# Patient Record
Sex: MALE | Race: WHITE | Employment: FULL TIME | URBAN - METROPOLITAN AREA
[De-identification: names, ages, dates, MRNs, and addresses within clinical notes are randomized per-mention and may not be internally consistent; named-entity substitution may affect disease eponyms.]

---

## 2017-02-09 ENCOUNTER — OFFICE VISIT (OUTPATIENT)
Dept: NEUROLOGY | Age: 52
End: 2017-02-09

## 2017-02-09 VITALS
SYSTOLIC BLOOD PRESSURE: 152 MMHG | HEART RATE: 71 BPM | WEIGHT: 215 LBS | DIASTOLIC BLOOD PRESSURE: 91 MMHG | HEIGHT: 66 IN | BODY MASS INDEX: 34.55 KG/M2 | TEMPERATURE: 97.8 F

## 2017-02-09 DIAGNOSIS — I63.50 CEREBROVASCULAR ACCIDENT (CVA) DUE TO OCCLUSION OF CEREBRAL ARTERY (HCC): Primary | ICD-10-CM

## 2017-02-09 RX ORDER — ATORVASTATIN CALCIUM 80 MG/1
80 TABLET, FILM COATED ORAL DAILY
Qty: 30 TAB | Refills: 2 | Status: SHIPPED | OUTPATIENT
Start: 2017-02-09 | End: 2017-02-09 | Stop reason: DRUGHIGH

## 2017-02-09 RX ORDER — CLOPIDOGREL BISULFATE 75 MG/1
75 TABLET ORAL DAILY
Qty: 21 TAB | Refills: 0 | Status: SHIPPED | OUTPATIENT
Start: 2017-02-09 | End: 2017-02-28 | Stop reason: SDUPTHER

## 2017-02-09 RX ORDER — ASPIRIN 325 MG
325 TABLET ORAL DAILY
Qty: 30 TAB | Refills: 2 | Status: SHIPPED | OUTPATIENT
Start: 2017-02-09

## 2017-02-09 RX ORDER — ATORVASTATIN CALCIUM 20 MG/1
TABLET, FILM COATED ORAL DAILY
COMMUNITY
End: 2017-02-09 | Stop reason: DRUGHIGH

## 2017-02-09 RX ORDER — ATORVASTATIN CALCIUM 80 MG/1
80 TABLET, FILM COATED ORAL DAILY
Qty: 30 TAB | Refills: 0 | Status: SHIPPED | OUTPATIENT
Start: 2017-02-09 | End: 2017-03-17 | Stop reason: SDUPTHER

## 2017-02-09 NOTE — MR AVS SNAPSHOT
Visit Information Date & Time Provider Department Dept. Phone Encounter #  
 2/9/2017  2:00 PM Laurent Avilez, [address] [place] [address] [phone] [id number] Upcoming Health Maintenance Date Due DTaP/Tdap/Td series (1 - Tdap) 6/28/1987 FOBT Q 1 YEAR AGE 50-75 6/28/2016 INFLUENZA AGE 9 TO ADULT 8/1/2016 Allergies as of 2/9/2017  Review Complete On: 2/9/2017 By: Audrey Joshi No Known Allergies Current Immunizations  Never Reviewed No immunizations on file. Not reviewed this visit You Were Diagnosed With   
  
 Codes Comments Cerebrovascular accident (CVA) due to occlusion of cerebral artery ([facility]    -  Primary ICD-10-CM: I63.50 ICD-9-CM: 434.91 Vitals BP Pulse Temp Height(growth percentile) Weight(growth percentile) BMI  
 (!) 152/91 71 97.8 °F (36.6 °C) (Oral) 5' 6\" (1.676 m) 215 lb (97.5 kg) 34.7 kg/m2 Smoking Status Never Smoker Vitals History BMI and BSA Data Body Mass Index Body Surface Area 34.7 kg/m 2 2.13 m 2 Preferred Pharmacy Pharmacy Name Phone [facility], [facility] [address] [phone] Your Updated Medication List  
  
   
This list is accurate as of: 2/9/17  4:51 PM.  Always use your most recent med list.  
  
  
  
  
 aspirin 325 mg tablet Commonly known as:  ASPIRIN Take 1 Tab by mouth daily. Indications: acute thromboembolic stroke  
  
 atorvastatin 80 mg tablet Commonly known as:  LIPITOR Take 1 Tab by mouth daily. Indications: prevention of cerebrovascular accident  
  
 clopidogrel 75 mg Tab Commonly known as:  PLAVIX Take 1 Tab by mouth daily for 21 days. Indications: Cerebral Thromboembolism Prevention  
  
 gabapentin 100 mg capsule Commonly known as:  NEURONTIN Take 300 mg by mouth three (3) times daily. LANTUS 100 unit/mL injection Generic drug:  insulin glargine by SubCUTAneous route nightly. levETIRAcetam 1,000 mg tablet Commonly known as:  KEPPRA Take 1 Tab by mouth two (2) times a day. lisinopril 20 mg tablet Commonly known as:  Park Cambria Take 20 mg by mouth daily. metFORMIN 500 mg tablet Commonly known as:  GLUCOPHAGE Take 1.5 Tabs by mouth two (2) times daily (with meals). valsartan 160 mg tablet Commonly known as:  DIOVAN Take 160 mg by mouth daily. Prescriptions Sent to Pharmacy Refills  
 aspirin (ASPIRIN) 325 mg tablet 2 Sig: Take 1 Tab by mouth daily. Indications: acute thromboembolic stroke Class: Normal  
 Pharmacy: [id number] [address] [place] [address],[address], [address] Ph #: [phone] Route: Oral  
 clopidogrel (PLAVIX) 75 mg tab 0 Sig: Take 1 Tab by mouth daily for 21 days. Indications: Cerebral Thromboembolism Prevention Class: Normal  
 Pharmacy: [id number] [address] [place] [address],[address], [address] Ph #: [phone] Route: Oral  
 atorvastatin (LIPITOR) 80 mg tablet 0 Sig: Take 1 Tab by mouth daily. Indications: prevention of cerebrovascular accident Class: Normal  
 Pharmacy: [id number] [address] [place] [address],[address], [address] Ph #: [phone] Route: Oral  
  
To-Do List   
 02/10/2017 Imaging:  CTA NECK   
  
 02/20/2017 12:15 PM  
  Appointment with [facility] CT RM 2 at [facility] CT ([phone]) DIET RESTRICTIONS  Nothing to eat or drink 4 hours prior to study May have water to take meds  GENERAL INSTRUCTIONS  If you were given medications to take for a contrast allergy prior to having this study, please arrange to have someone drive you to your appointment. If you have had a creatinine level drawn within the past 30 days, please bring most recent results to your appt. This study does not require you to drink contrast prior to your study.   MEDICATIONS  Bring a complete list of all medications you are currently taking to include prescriptions, over-the-counter meds, herbals, vitamins & any dietary supplements. OUTSIDE FILMS  Bring outside films, CDs, and reports related to the study with you on the day of your exam.  QUESTIONS  Notify the CT Department if you have any questions concerning your study. Mayra Buchanan - [phone] [facility] Aba Tierney - [phone] Introducing [facility]! Dear Humphrey Parker: Thank you for requesting a [employer] account. Our records indicate that you already have an active [employer] account. You can access your account anytime at [email or web address] Did you know that you can access your hospital and ER discharge instructions at any time in [employer]? You can also review all of your test results from your hospital stay or ER visit. Additional Information If you have questions, please visit the Frequently Asked Questions section of the [employer] website at [email or web address] Remember, [employer] is NOT to be used for urgent needs. For medical emergencies, dial 911. Now available from your iPhone and Android! Please provide this summary of care documentation to your next provider. Your primary care clinician is listed as Lorena Sanchez. If you have any questions after today's visit, please call [phone].

## 2017-02-11 ENCOUNTER — TELEPHONE (OUTPATIENT)
Dept: NEUROLOGY | Age: 52
End: 2017-02-11

## 2017-02-11 NOTE — TELEPHONE ENCOUNTER
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 The patient has a history of epilepsy but did not have any seizures for may years. He had pancreatitis last year and his seizure medication was stopped. Two months ago he had a seizure and was evaluated by Dr. Vikash Velazquez and NP Adrian Paul. Levetiracetam was started. His studies at that time also showed a subacute RPICA infarct with intracranial RVA and RPICA occlusion. The patient was referred to the [address] [place] [address]. He was evaluated two days ago in the outpatient clinic. Further workup with CTA of the neck and implanted cardiac monitor (loop recorder) was requested. Clopidogrel was added to full dose aspirin for DAPT that is indicated for 3 months after an event from intracranial stenosis thereafter SAPT with full dose aspirin. Carina Skill was increased to full dose. The patient related he experienced an episode of decreased responsiveness about 1 hour ago. He said his wife told him that she had to help him but he does not remember it. She is not currently available. EMS was called but he declined to go to an emergency department. The patient says he is feeling better but not quite back to normal.  Hel does not sound well on the telephone       He was advised to go to the closest emergency department ([facility] or [facility]) preferably by EMS. It is not clear if his events are seizure or brainstem ischemia. Previously an MRI was not performed because of a long standing piece of metal in the skin over his left knee after trauma. An MRI should be performed if the metal item will not dislodge. If the metal item is problematic then removal should be considered so he can undergo MRI to help determine the etiology of these events that might cause permanent disability or even death.     If there is concern for cerebral ischemia the patient should be considered for transfer to the [address] [place] [address] through the [facility] ED. [facility] teleneurology should be contacted when he arrives. The teleneurologist will contact the neurovascular service if needed. The patient was evaluated two days ago in the neurovascular clinic for his intracranial stenosis and stroke.

## 2017-02-20 ENCOUNTER — HOSPITAL ENCOUNTER (OUTPATIENT)
Dept: CT IMAGING | Age: 52
Discharge: HOME | End: 2017-02-20
Attending: NURSE PRACTITIONER
Payer: COMMERCIAL

## 2017-02-20 DIAGNOSIS — I63.50 CEREBROVASCULAR ACCIDENT (CVA) DUE TO OCCLUSION OF CEREBRAL ARTERY (HCC): ICD-10-CM

## 2017-02-20 LAB — CREAT UR-MCNC: 0.6 MG/DL (ref 0.6–1.3)

## 2017-02-20 PROCEDURE — 74011636320 HC RX REV CODE- 636/320: Performed by: NURSE PRACTITIONER

## 2017-02-20 PROCEDURE — 74011000258 HC RX REV CODE- 258: Performed by: NURSE PRACTITIONER

## 2017-02-20 PROCEDURE — 82565 ASSAY OF CREATININE: CPT

## 2017-02-20 PROCEDURE — 70498 CT ANGIOGRAPHY NECK: CPT

## 2017-02-20 RX ORDER — SODIUM CHLORIDE 9 MG/ML
100 INJECTION, SOLUTION INTRAVENOUS ONCE
Status: COMPLETED | OUTPATIENT
Start: 2017-02-20 | End: 2017-02-20

## 2017-02-20 RX ADMIN — SODIUM CHLORIDE 100 ML: 900 INJECTION, SOLUTION INTRAVENOUS at 13:17

## 2017-02-20 RX ADMIN — IOPAMIDOL 45 ML: 612 INJECTION, SOLUTION INTRAVENOUS at 13:16

## 2017-02-27 PROBLEM — I10 ESSENTIAL HYPERTENSION: Status: ACTIVE | Noted: 2017-02-27

## 2017-02-27 NOTE — PROGRESS NOTES
Kostas Lord is a 49 y.o. 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 The patient reports that on 12/23/2016 when he was at work (he is a ) he remembers walking over to operate a switch and then ended up being found down by coworkers. He does have a history of seizures for which he was on gabapentin. He saw Dr. Mercedes Acharya at [facility] and followed up as well with neurologist NP Daleen Kanner (he is a patient of Dr. Larisa Greenberg neurologist). CT head at [facility] on 12/23/2016 reported subacute/chronic right cerebellar infarction and CTA with nonvisualization of the distal right vertebral artery and proximal right PICA concerning for occlusion. EEG 12/26/2016 impression: 'abnormal and reveals generalized interictal epileptiform discharges consistent with epilepsy and seizure disorder. There were no recorded seizures or electrographic seizure patterns during this recording, However.' He was started on levetiracetam in addition to his gabapentin. He was advised on risk factor control and medical management and also to follow up with a polysomnography as an outpatient. The patient denies any dizziness, balance problems, visual or sensory disturbances, speech problems, or weakness. He had a hemoglobin a1c of 10.5 during his hospitalization in December. Total cholesterol 147, LDL 78. He denies tobacco, alcohol, or illicit drug use. Review of Systems   Constitutional: Negative. HENT: Negative. Eyes: Negative. Respiratory: Negative. Cardiovascular: Negative. Gastrointestinal: Negative. Genitourinary: Negative. Musculoskeletal: Positive for falls. Neurological: Positive for seizures and loss of consciousness. Endo/Heme/Allergies: Negative.         Past Medical History:   Diagnosis Date    Diabetes ([facility]     Hypertension     Seizure [facility])        History reviewed. No pertinent surgical history. No Known Allergies    Current Outpatient Prescriptions   Medication Sig Dispense Refill    aspirin (ASPIRIN) 325 mg tablet Take 1 Tab by mouth daily. Indications: acute thromboembolic stroke 30 Tab 2    clopidogrel (PLAVIX) 75 mg tab Take 1 Tab by mouth daily for 21 days. Indications: Cerebral Thromboembolism Prevention 21 Tab 0    atorvastatin (LIPITOR) 80 mg tablet Take 1 Tab by mouth daily. Indications: prevention of cerebrovascular accident 30 Tab 0    metFORMIN (GLUCOPHAGE) 500 mg tablet Take 1.5 Tabs by mouth two (2) times daily (with meals). 60 Tab 0    levETIRAcetam (KEPPRA) 1,000 mg tablet Take 1 Tab by mouth two (2) times a day. 60 Tab 0    valsartan (DIOVAN) 160 mg tablet Take 160 mg by mouth daily.  gabapentin (NEURONTIN) 100 mg capsule Take 300 mg by mouth three (3) times daily.  insulin glargine (LANTUS) 100 unit/mL injection by SubCUTAneous route nightly.  lisinopril (PRINIVIL, ZESTRIL) 20 mg tablet Take 20 mg by mouth daily. Social History     Social History    Marital status:      Spouse name: N/A    Number of children: N/A    Years of education: N/A     Occupational History    Not on file. Social History Main Topics    Smoking status: Never Smoker    Smokeless tobacco: Not on file    Alcohol use Yes      Comment: Occasionally    Drug use: Not on file    Sexual activity: Not on file     Other Topics Concern    Not on file     Social History Narrative       Family history: Positive for diabetes in his mother and unknown medical history for his father. Vitals:    02/09/17 1526 02/09/17 1527   BP: 144/80 (!) 152/91   Pulse: 71 71   Temp: 97.8 °F (36.6 °C)    TempSrc: Oral    Weight: 97.5 kg (215 lb)    Height: 5' 6\" (1.676 m)         Neurologic Exam     Mental Status   Oriented to person, place, and time. Follows 3 step commands.    Attention: normal. Speech: speech is normal   Level of consciousness: alert  Knowledge: good. Normal comprehension. Cranial Nerves     CN II   Visual fields full to confrontation. CN III, IV, VI   Pupils are equal, round, and reactive to light. Extraocular motions are normal.   Diplopia: none  Ophthalmoparesis: none  Upgaze: normal  Downgaze: normal  Conjugate gaze: present    CN V   Facial sensation intact. CN VII   Facial expression full, symmetric. CN VIII   Hearing: intact    CN IX, X   Palate: symmetric    CN XI   CN XI normal.     CN XII   CN XII normal.     Motor Exam     Strength   Strength 5/5 throughout. Sensory Exam   Light touch normal.     Gait, Coordination, and Reflexes     Coordination   Finger to nose coordination: normal      Physical Exam   Constitutional: He is oriented to person, place, and time. He appears well-developed and well-nourished. HENT:   Head: Normocephalic and atraumatic. Eyes: EOM are normal. Pupils are equal, round, and reactive to light. Neck: Normal range of motion. Cardiovascular: Normal rate and regular rhythm. Pulmonary/Chest: Effort normal and breath sounds normal.   Musculoskeletal: Normal range of motion. Neurological: He is alert and oriented to person, place, and time. He has normal strength. He has a normal Finger-Nose-Finger Test.   Skin: Skin is warm and dry. Psychiatric: He has a normal mood and affect. His speech is normal and behavior is normal. Judgment and thought content normal.   Vitals reviewed. Data:  CT 02/24/2016 Subacute infarct distal RPICA  CTA head 02/24/2016 RVA and RPICA no filling observed. RA1 origin severe stenosis. LPCoA infundibulum. Assessment and Plan: This is a 49year old male who was recently admitted to [facility] with seizure and found to have right cerebellar infarct who was referred to the [address] [place] [address] for RVA and PICA occlusion.     Continue dual antiplatelet therapy with clopidogrel and aspirin (he has about 6 more weeks of clopidogrel from this visit for a total of 90 days), blood pressure control with systolic less 686-983, and glycemic control A1c goal about 7. He should follow up with his PCP regarding blood pressure and control of diabetes. Continue HMGCoARI but raised to high dose and this should be followed as well. A CTA of the neck will be evaluated to look at the neck vessels. Also, as outlined in another telephone encounter note, the patient should have an MRI to look for the etiology of these events if the piece of metal in his skin over his left knee will not dislodge, or if it is problematic, then removal should be considered. Referral for implantable cardiac monitor as well. The reasons to call 911 and to present to the ED were discussed. It was discussed with the patient also that he should not drive or operate heavy equipment for six months from episode of impaired consciousness. Total time spent 45 minutes with 25 minutes spent in counseling.

## 2017-02-28 RX ORDER — CLOPIDOGREL BISULFATE 75 MG/1
75 TABLET ORAL DAILY
Qty: 21 TAB | Refills: 0 | Status: SHIPPED | OUTPATIENT
Start: 2017-02-28 | End: 2017-03-21

## 2017-03-16 ENCOUNTER — TELEPHONE (OUTPATIENT)
Dept: CARDIOLOGY CLINIC | Age: 52
End: 2017-03-16

## 2017-03-16 ENCOUNTER — OFFICE VISIT (OUTPATIENT)
Dept: CARDIOLOGY CLINIC | Age: 52
End: 2017-03-16

## 2017-03-16 VITALS
SYSTOLIC BLOOD PRESSURE: 130 MMHG | HEART RATE: 72 BPM | WEIGHT: 216 LBS | BODY MASS INDEX: 34.72 KG/M2 | OXYGEN SATURATION: 98 % | HEIGHT: 66 IN | DIASTOLIC BLOOD PRESSURE: 90 MMHG

## 2017-03-16 DIAGNOSIS — Z01.818 PRE-OP TESTING: ICD-10-CM

## 2017-03-16 DIAGNOSIS — I63.9 ACUTE CVA (CEREBROVASCULAR ACCIDENT) (HCC): ICD-10-CM

## 2017-03-16 DIAGNOSIS — R56.9 SEIZURE (HCC): ICD-10-CM

## 2017-03-16 DIAGNOSIS — I10 ESSENTIAL HYPERTENSION: Primary | ICD-10-CM

## 2017-03-16 NOTE — PROGRESS NOTES
1. Have you been to the ER, urgent care clinic since your last visit? Hospitalized since your last visit? No    2. Have you seen or consulted any other health care providers outside of the [facility] since your last visit? Include any pap smears or colon screening.  No    Verbal order and read back per Ivone Ramirez MD

## 2017-03-16 NOTE — TELEPHONE ENCOUNTER
Dr. Angélica Cary office called back stating they recommend a implanted device; patient was then called and informed patient. Patient states he will contact Dr. Quoc Hurt.

## 2017-03-16 NOTE — PROGRESS NOTES
PATIENT NAME: London Elizalde         49 y.o.      1965              DATE:3/16/2017    REASON FOR VISIT: Palpitations    HISTORY OF PRESENT ILLNESS: The patient is a 69-year-old man who is status post a cerebrovascular accident involving the posterior circulation. His neurologist has referred him for implantation of a loop recorder. . The patient denies a history of atrial fibrillation. He has been experiencing sensation of heart pounding intermittently. The patient is a hypertensive diabetic and has a history of a grand mal seizure disorder. There is no history of coronary artery disease  Denies chest pain and other symptoms suggestive of angina. Denies FORDE, PND, orthopnea. Denies palpitation, syncope, presyncope. Denies edema in the lower extremities. PAST MEDICAL HISTORY:   Past Medical History:  [facility]  No date: Hypertension  No date: [facility]    PAST SURGICAL HISTORY:   No past surgical history on file. SOCIAL HISTORY:  Social History    Marital status:              Spouse name:                       Years of education:                 Number of children:               Social History Main Topics    Smoking status: Never Smoker                                                                Alcohol use: Yes                Comment: Occasionally      ALLERGIES:   No Known Allergies     CURRENT MEDICATIONS:   Current Outpatient Prescriptions:  clopidogrel (PLAVIX) 75 mg tab, Take 1 Tab by mouth daily for 21 days. Indications: Cerebral Thromboembolism Prevention  aspirin (ASPIRIN) 325 mg tablet, Take 1 Tab by mouth daily. Indications: acute thromboembolic stroke  metFORMIN (GLUCOPHAGE) 500 mg tablet, Take 1.5 Tabs by mouth two (2) times daily (with meals). levETIRAcetam (KEPPRA) 1,000 mg tablet, Take 1 Tab by mouth two (2) times a day. valsartan (DIOVAN) 160 mg tablet, Take 160 mg by mouth daily.   gabapentin (NEURONTIN) 100 mg capsule, Take 300 mg by mouth three (3) times daily. lisinopril (PRINIVIL, ZESTRIL) 20 mg tablet, Take 20 mg by mouth daily. insulin glargine (LANTUS) 100 unit/mL injection, by SubCUTAneous route nightly. atorvastatin (LIPITOR) 80 mg tablet, Take 1 Tab by mouth daily. Indications: prevention of cerebrovascular accident    No current facility-administered medications for this visit. REVIEW of SYSTEMS:History obtained from chart review and the patient  General ROS: negative for - weight gain or weight loss  Hematological and Lymphatic ROS: negative for - bleeding problems  Respiratory ROS: no cough, shortness of breath, or wheezing  Cardiovascular ROS: Please see history of present illness  Gastrointestinal ROS: no abdominal pain, change in bowel habits, or black or bloody stools  Neurological ROS: no TIA or stroke symptoms     PHYSICAL EXAMINATION:   /90 (BP 1 Location: Right arm, BP Patient Position: Sitting)  Pulse 72  Ht 5' 6\" (1.676 m)  Wt 98 kg (216 lb)  SpO2 98%  BMI 34.86 kg/m2  BP Readings from Last 3 Encounters:  03/16/17 : 130/90  02/09/17 : (!) 152/91  12/26/16 : 131/87    Pulse Readings from Last 3 Encounters:  03/16/17 : 72  02/09/17 : 71  12/26/16 : 69    Wt Readings from Last 3 Encounters:  03/16/17 : 98 kg (216 lb)  02/09/17 : 97.5 kg (215 lb)  12/25/16 : 99.8 kg (220 lb 0.3 oz)    General: Well-developed white male in no apparent distress. HEENT: Sclera clear. Mucous membranes pink and moist.  Neck: No jugular venous distention. Carotid upstrokes 2+ without bruits. Chest: Clear to percussion and auscultation. Heart: PMI not palpable. Regular rhythm. No murmur or gallop. Abdomen: Nontender without masses or organomegaly. Extremities: No edema. .  Skin: Warm and dry. No stasis changes. Neuro: Alert, oriented, speech WNL, no facial asymmetry. Gait WNL.     EKG: Minor nonspecific T changes lateral precordial leads    IMPRESSION:   Cryptogenic cerebrovascular accident involving the posterior circulation  Palpitation of unknown etiology  Grand mal seizure disorder  Diabetes  Hypertension    PLAN:  While arranging for the implantable loop recorder, the patient told me that his neurologist was talking about 4 or 5 weeks of monitoring. I wonder if the neurologist is referring to a cardiac event recorder rather than an implantable loop recorder. Will contact his office for clarification    The diagnoses and plan were discussed with patient. All questions answered. Plan of care agreed to by all concerned. Jada Carreon.  MD Rigoberto       ,

## 2017-03-16 NOTE — TELEPHONE ENCOUNTER
Patient  referred by Dr. Quoc Hurt for possible  implantable cardiac monitor per Dr. Johnny Wang last note, however patient states he was told he would have the device for a few months, called Dr. Johnny Wang office, left message for his nurse regarding clarity on what Dr. Quoc Hurt was referring to.

## 2017-03-17 RX ORDER — ATORVASTATIN CALCIUM 80 MG/1
80 TABLET, FILM COATED ORAL DAILY
Qty: 21 TAB | Refills: 1 | Status: SHIPPED | OUTPATIENT
Start: 2017-03-17 | End: 2017-05-02 | Stop reason: SDUPTHER

## 2017-05-02 ENCOUNTER — OFFICE VISIT (OUTPATIENT)
Dept: CARDIOLOGY CLINIC | Age: 52
End: 2017-05-02

## 2017-05-02 ENCOUNTER — HOSPITAL ENCOUNTER (OUTPATIENT)
Dept: LAB | Age: 52
Discharge: HOME | End: 2017-05-02
Payer: COMMERCIAL

## 2017-05-02 ENCOUNTER — HOSPITAL ENCOUNTER (OUTPATIENT)
Dept: GENERAL RADIOLOGY | Age: 52
Discharge: HOME | End: 2017-05-02
Payer: COMMERCIAL

## 2017-05-02 VITALS
OXYGEN SATURATION: 95 % | SYSTOLIC BLOOD PRESSURE: 146 MMHG | WEIGHT: 219 LBS | DIASTOLIC BLOOD PRESSURE: 90 MMHG | HEART RATE: 66 BPM | HEIGHT: 66 IN | BODY MASS INDEX: 35.2 KG/M2

## 2017-05-02 DIAGNOSIS — R56.9 SEIZURE (HCC): ICD-10-CM

## 2017-05-02 DIAGNOSIS — I10 ESSENTIAL HYPERTENSION: ICD-10-CM

## 2017-05-02 DIAGNOSIS — I63.9 ACUTE CVA (CEREBROVASCULAR ACCIDENT) (HCC): ICD-10-CM

## 2017-05-02 DIAGNOSIS — Z01.818 PRE-OP EVALUATION: Primary | ICD-10-CM

## 2017-05-02 DIAGNOSIS — Z01.818 PRE-OP TESTING: ICD-10-CM

## 2017-05-02 LAB
ALBUMIN SERPL BCP-MCNC: 3.8 G/DL (ref 3.4–5)
ALBUMIN/GLOB SERPL: 1.1 {RATIO} (ref 0.8–1.7)
ALP SERPL-CCNC: 80 U/L (ref 45–117)
ALT SERPL-CCNC: 31 U/L (ref 16–61)
ANION GAP BLD CALC-SCNC: 9 MMOL/L (ref 3–18)
AST SERPL W P-5'-P-CCNC: 18 U/L (ref 15–37)
BASOPHILS # BLD AUTO: 0.1 K/UL (ref 0–0.1)
BASOPHILS # BLD: 1 % (ref 0–2)
BILIRUB SERPL-MCNC: 0.4 MG/DL (ref 0.2–1)
BUN SERPL-MCNC: 12 MG/DL (ref 7–18)
BUN/CREAT SERPL: 16 (ref 12–20)
CALCIUM SERPL-MCNC: 9.2 MG/DL (ref 8.5–10.1)
CHLORIDE SERPL-SCNC: 103 MMOL/L (ref 100–108)
CO2 SERPL-SCNC: 27 MMOL/L (ref 21–32)
CREAT SERPL-MCNC: 0.75 MG/DL (ref 0.6–1.3)
DIFFERENTIAL METHOD BLD: NORMAL
EOSINOPHIL # BLD: 0.2 K/UL (ref 0–0.4)
EOSINOPHIL NFR BLD: 2 % (ref 0–5)
ERYTHROCYTE [DISTWIDTH] IN BLOOD BY AUTOMATED COUNT: 13.2 % (ref 11.6–14.5)
GLOBULIN SER CALC-MCNC: 3.5 G/DL (ref 2–4)
GLUCOSE SERPL-MCNC: 141 MG/DL (ref 74–99)
HCT VFR BLD AUTO: 41.7 % (ref 36–48)
HGB BLD-MCNC: 14.5 G/DL (ref 13–16)
INR PPP: 1 (ref 0.8–1.2)
LYMPHOCYTES # BLD AUTO: 30 % (ref 21–52)
LYMPHOCYTES # BLD: 3 K/UL (ref 0.9–3.6)
MCH RBC QN AUTO: 29.7 PG (ref 24–34)
MCHC RBC AUTO-ENTMCNC: 34.8 G/DL (ref 31–37)
MCV RBC AUTO: 85.3 FL (ref 74–97)
MONOCYTES # BLD: 0.8 K/UL (ref 0.05–1.2)
MONOCYTES NFR BLD AUTO: 8 % (ref 3–10)
NEUTS SEG # BLD: 6 K/UL (ref 1.8–8)
NEUTS SEG NFR BLD AUTO: 59 % (ref 40–73)
PLATELET # BLD AUTO: 264 K/UL (ref 135–420)
PMV BLD AUTO: 11.1 FL (ref 9.2–11.8)
POTASSIUM SERPL-SCNC: 4.3 MMOL/L (ref 3.5–5.5)
PROT SERPL-MCNC: 7.3 G/DL (ref 6.4–8.2)
PROTHROMBIN TIME: 12.8 SEC (ref 11.5–15.2)
RBC # BLD AUTO: 4.89 M/UL (ref 4.7–5.5)
SODIUM SERPL-SCNC: 139 MMOL/L (ref 136–145)
WBC # BLD AUTO: 10.2 K/UL (ref 4.6–13.2)

## 2017-05-02 PROCEDURE — 36415 COLL VENOUS BLD VENIPUNCTURE: CPT | Performed by: NURSE PRACTITIONER

## 2017-05-02 PROCEDURE — 71020 XR CHEST PA LAT: CPT

## 2017-05-02 PROCEDURE — 85025 COMPLETE CBC W/AUTO DIFF WBC: CPT | Performed by: NURSE PRACTITIONER

## 2017-05-02 PROCEDURE — 80053 COMPREHEN METABOLIC PANEL: CPT | Performed by: NURSE PRACTITIONER

## 2017-05-02 PROCEDURE — 85610 PROTHROMBIN TIME: CPT | Performed by: NURSE PRACTITIONER

## 2017-05-02 RX ORDER — ATORVASTATIN CALCIUM 80 MG/1
80 TABLET, FILM COATED ORAL DAILY
Qty: 21 TAB | Refills: 0 | Status: SHIPPED | OUTPATIENT
Start: 2017-05-02

## 2017-05-02 RX ORDER — INSULIN GLARGINE 100 [IU]/ML
66 INJECTION, SOLUTION SUBCUTANEOUS
Qty: 1 VIAL | Refills: 0
Start: 2017-05-02

## 2017-05-02 RX ORDER — VALSARTAN 160 MG/1
160 TABLET ORAL DAILY
Qty: 30 TAB | Refills: 1 | Status: SHIPPED | OUTPATIENT
Start: 2017-05-02 | End: 2018-11-06 | Stop reason: ALTCHOICE

## 2017-05-02 RX ORDER — METFORMIN HYDROCHLORIDE 500 MG/1
500 TABLET ORAL 2 TIMES DAILY WITH MEALS
Qty: 1 TAB | Refills: 0
Start: 2017-05-02

## 2017-05-02 NOTE — MR AVS SNAPSHOT
Visit Information Date & Time Provider Department Dept. Phone Encounter #  
 5/2/2017  2:00 PM Sindi Hadley NP [facility] [address] [id number] Upcoming Health Maintenance Date Due  
 FOOT EXAM Q1 6/28/1976 MICROALBUMIN Q1 6/28/1976 EYE EXAM RETINAL OR DILATED Q1 6/28/1976 Pneumococcal 19-64 Medium Risk (1 of 1 - PPSV23) 6/28/1985 DTaP/Tdap/Td series (1 - Tdap) 6/28/1987 FOBT Q 1 YEAR AGE 50-75 6/28/2016 HEMOGLOBIN A1C Q6M 6/24/2017 INFLUENZA AGE 9 TO ADULT 8/1/2017 LIPID PANEL Q1 12/25/2017 Allergies as of 5/2/2017  Review Complete On: 5/2/2017 By: Sindi Hadley NP No Known Allergies Current Immunizations  Never Reviewed No immunizations on file. Not reviewed this visit You Were Diagnosed With   
  
 Codes Comments Pre-op evaluation    -  Primary ICD-10-CM: I89.143 ICD-9-CM: V72.84 Essential hypertension     ICD-10-CM: I10 
ICD-9-CM: 401.9 Acute CVA (cerebrovascular accident) ([facility] [address]     ICD-10-CM: I63.9 ICD-9-CM: 434.91 Seizure ([facility] [address]     ICD-10-CM: R56.9 ICD-9-CM: 780.39 Vitals BP Pulse Height(growth percentile) Weight(growth percentile) SpO2 BMI  
 146/90 (BP 1 Location: Left arm, BP Patient Position: Sitting) 66 5' 6\" (1.676 m) 219 lb (99.3 kg) 95% 35.35 kg/m2 Smoking Status Never Smoker BMI and BSA Data Body Mass Index Body Surface Area  
 35.35 kg/m 2 2.15 m 2 Preferred Pharmacy Pharmacy Name Phone [facility], [facility] [address]. [phone] Your Updated Medication List  
  
   
This list is accurate as of: 5/2/17  3:01 PM.  Always use your most recent med list.  
  
  
  
  
 aspirin 325 mg tablet Commonly known as:  ASPIRIN Take 1 Tab by mouth daily. Indications: acute thromboembolic stroke  
  
 atorvastatin 80 mg tablet Commonly known as:  LIPITOR Take 1 Tab by mouth daily. Indications: prevention of cerebrovascular accident  
  
 gabapentin 100 mg capsule Commonly known as:  NEURONTIN Take 300 mg by mouth three (3) times daily. insulin glargine 100 unit/mL injection Commonly known as:  LANTUS  
66 Units by SubCUTAneous route nightly. levETIRAcetam 1,000 mg tablet Commonly known as:  KEPPRA Take 1 Tab by mouth two (2) times a day. metFORMIN 500 mg tablet Commonly known as:  GLUCOPHAGE Take 1 Tab by mouth two (2) times daily (with meals). valsartan 160 mg tablet Commonly known as:  DIOVAN Take 1 Tab by mouth daily. Prescriptions Sent to Pharmacy Refills  
 valsartan (DIOVAN) 160 mg tablet 1 Sig: Take 1 Tab by mouth daily. Class: Normal  
 Pharmacy: [id number] [place] [address],[address], [address] Ph #: [phone] Route: Oral  
 atorvastatin (LIPITOR) 80 mg tablet 0 Sig: Take 1 Tab by mouth daily. Indications: prevention of cerebrovascular accident Class: Normal  
 Pharmacy: [id number] [address] [place] [address],[address], [address] Ph #: [phone] Route: Oral  
  
We Performed the Following AMB POC EKG ROUTINE W/ 12 LEADS, INTER & REP [24488 CPT(R)] To-Do List   
 05/02/2017 Lab:  CBC WITH AUTOMATED DIFF   
  
 05/02/2017 Lab:  METABOLIC PANEL, COMPREHENSIVE   
  
 05/02/2017 Lab:  PROTHROMBIN TIME + INR Patient Instructions Restart valsartan 160mg once a day. Please have PCP send in for your 90 day script when go in for follow-up in one month Follow-up with Dr. Richard Romero as scheduled and as needed [facility] Patient  EP Instructions 1. You are scheduled to have a Loop recorder implant on  May 09, 2017 , at 0915 am.    Please check in at 0800 am. 
 
2. Please go to [facility] and park in the outpatient parking lot that is located around to the back of the hospital and enter through the [facility]. Once you enter through the [facility] [place] check in with the  there. The  will either give you directions or assist you in getting to the cath holding area. 3.         You are not to eat or drink anything after midnight the morning of the  
            procedure. 4. Please continue to take your medications with a small sip of water on the morning of the procedure with the following exceptions:  Hold Lantus and glucophage the morning of your procedure. 5. If you are diabetic, do not take your insulin/sugar pill the morning of the procedure. 6. We encourage families to wait in the waiting room on the first floor while the procedure is being done. The Doctor will come out and talk with you as soon as the procedure is over. 7. There is the possibility that you may spend the night in the hospital, depending on the results of the procedure. This will be determined after the procedure is done. 8.   If you or your family have any questions, please call our office Monday-Friday 9:00am  
      -4:30 pm , at [phone], and ask to speak to one of the nurses. Introducing [facility]! Dear Samantha : Thank you for requesting a [employer] account. Our records indicate that you already have an active [employer] account. You can access your account anytime at [email or web address] Did you know that you can access your hospital and ER discharge instructions at any time in [employer]? You can also review all of your test results from your hospital stay or ER visit. Additional Information If you have questions, please visit the Frequently Asked Questions section of the [employer] website at [email or web address] Remember, [employer] is NOT to be used for urgent needs. For medical emergencies, dial 911. Now available from your iPhone and Android! Please provide this summary of care documentation to your next provider. Your primary care clinician is listed as Karena Momin. If you have any questions after today's visit, please call [phone].

## 2017-05-02 NOTE — PROGRESS NOTES
1. Have you been to the ER, urgent care clinic since your last visit? Hospitalized since your last visit? No    2. Have you seen or consulted any other health care providers outside of the [address] [place] [address] since your last visit? Include any pap smears or colon screening.  No    Verbal order and read back per Ebony Lee NP

## 2017-05-02 NOTE — PROGRESS NOTES
Sade Shell presented today for preprocedure history and physical.  He is scheduled for a loop recorder implant on May 9, 2017. He is a 56-year-old  male with history of a CVA involving the posterior circulation. His neurologist referred him to cardiology for implantation of a loop recorder. He denies history of atrial fibrillation but does admit to experiencing a pounding heartbeat intermittently and palpitations. He has history of diabetes mellitus, hypertension, and seizure disorder. He is followed by Dr. Zev Munguia (Neurology) in [place]. Overall, he states that he is feeling well. He denies chest pain, tightness, heaviness, and admits to occasional palpitations and the sensation of a pounding heart beat. He denies shortness of breath at rest, dyspnea on exertion, orthopnea and PND. He denies abdominal bloating. He denies lightheadedness, dizziness, and syncope. He denies lower extremity edema and claudication. Denies nausea, vomiting, diarrhea, fever, chills. While reviewing his medications, he admitted that he has been out of his valsartan for about a week. He also states that he is out of his atorvastatin which is normally prescribed by his primary care physician. He has follow-up with his primary care physician in about a month. PMH:  Past Medical History:   Diagnosis Date    Diabetes ([facility] [address]     Hypertension     Seizure ([facility] [address]        PSH:  History reviewed. No pertinent surgical history. MEDS:  Current Outpatient Prescriptions   Medication Sig    valsartan (DIOVAN) 160 mg tablet Take 1 Tab by mouth daily.  metFORMIN (GLUCOPHAGE) 500 mg tablet Take 1 Tab by mouth two (2) times daily (with meals).  atorvastatin (LIPITOR) 80 mg tablet Take 1 Tab by mouth daily. Indications: prevention of cerebrovascular accident    insulin glargine (LANTUS) 100 unit/mL injection 66 Units by SubCUTAneous route nightly.  aspirin (ASPIRIN) 325 mg tablet Take 1 Tab by mouth daily. Indications: acute thromboembolic stroke    levETIRAcetam (KEPPRA) 1,000 mg tablet Take 1 Tab by mouth two (2) times a day.  gabapentin (NEURONTIN) 100 mg capsule Take 300 mg by mouth three (3) times daily. No current facility-administered medications for this visit. Allergies and Sensitivities:  No Known Allergies    Family History:  Family History   Problem Relation Age of Onset    Heart Surgery Mother     COPD Mother     Diabetes Mother        Social History:  He  reports that he has never smoked. He does not have any smokeless tobacco history on file. He  reports that he does not drink alcohol. Physical:  Visit Vitals    /90 (BP 1 Location: Left arm, BP Patient Position: Sitting)    Pulse 66    Ht 5' 6\" (1.676 m)    Wt 99.3 kg (219 lb)    SpO2 95%    BMI 35.35 kg/m2         Exam:  Neck:  Supple, no JVD, no carotid bruits  CV:  Normal S1 and  S2, no murmurs, rubs, or gallops noted  Lungs:  Clear to ausculation throughout, no wheezes or rales  Abd:  Soft, non-tender, non-distended with good bowel sounds. No hepatosplenomegaly  Extremities:  No edema      Data:  EKG:       Normal sinus rhythm, rate 66.  LVH by limb lead voltage criteria, otherwise within normal limits.           LABS:  Lab Results   Component Value Date/Time    Sodium 138 12/25/2016 04:49 AM    Potassium 4.0 12/25/2016 04:49 AM    Chloride 104 12/25/2016 04:49 AM    CO2 26 12/25/2016 04:49 AM    Glucose 231 12/25/2016 04:49 AM    BUN 16 12/25/2016 04:49 AM    Creatinine 0.7 12/25/2016 04:49 AM     Lab Results   Component Value Date/Time    Cholesterol, total 147 12/25/2016 04:49 AM    HDL Cholesterol 36 12/25/2016 04:49 AM    LDL, calculated 78 12/25/2016 04:49 AM    Triglyceride 163 12/25/2016 04:49 AM    CHOL/HDL Ratio 4.1 12/25/2016 04:49 AM     Lab Results   Component Value Date/Time    ALT (SGPT) 19 12/25/2016 04:49 AM       Impression / Plan:  1. Palpitations/\"pounding heart beats\"  2.   History of CVA involving the posterior circulation with no residual effects  3. Hypertension, blood pressure elevated but he has been without his valsartan for about a week  4. Diabetes mellitus, recommend Hgb A1c less than 7% from cardiac standpoint    Mr. Zaid Ortiz seen today for preprocedure history and physical.  He is scheduled for a loop recorder implant on May 9, 2017. He has complaints of palpitations and pounding heartbeat. Implant of the loop recorder was requested by his neurologist.  The procedure was discussed with him, his questions were answered, and he is ready to proceed. Preprocedure instructions were reviewed with him by 1 of our nurses. Overall, he states that he is feeling well. He does not have any residual effects from the CVA that involves the posterior circulation. He admits to occasional palpitations and the sensation of a pounding heartbeat. His blood pressure is elevated today and he admits that he has been without his valsartan for about a week. He will be prescribed a 30 day supply with 1 refill as he states that he will ask for his 90 day prescription from his primary care physician when he sees them next month. He is also out of his atorvastatin and he was also prescribed a 21 day supply (as he states that his insurance will only cover 21 days at a local pharmacy). He states that he will ask for a 90 day prescription from his PCP when he sees them early next month. No adjustments were made to his medication regimen at this time. He will follow-up with Dr. Ute Sheriff as scheduled and as needed. He will follow-up in the device clinic for interrogation of his loop recorder.       Aureliano Gowers MSN, FNP-BC

## 2017-05-02 NOTE — PATIENT INSTRUCTIONS
Restart valsartan 160mg once a day. Please have PCP send in for your 90 day script when go in for follow-up in one month  Follow-up with Dr. Nic Stevenson as scheduled and as needed             [facility]          Patient  EP Instructions                  1. You are scheduled to have a Loop recorder implant on  May 09, 2017 , at 0915 am.    Please check in at 0800 am.    2. Please go to [facility] and park in the outpatient parking lot that is located around to the back of the hospital and enter through the [employer]. Once you enter through the [facility] check in with the  there. The  will either give you directions or assist you in getting to the cath holding area. 3.  []       You are not to eat or drink anything after midnight the morning of the               procedure. 4. Please continue to take your medications with a small sip of water on the morning of the procedure with the following exceptions:  Hold Lantus and glucophage the morning of your procedure. 5. If you are diabetic, do not take your insulin/sugar pill the morning of the procedure. 6. We encourage families to wait in the waiting room on the first floor while the procedure is being done. The Doctor will come out and talk with you as soon as the procedure is over. 7. There is the possibility that you may spend the night in the hospital, depending on the results of the procedure. This will be determined after the procedure is done. 8.   If you or your family have any questions, please call our office Monday-Friday 9:00am         -4:30 pm , at [phone], and ask to speak to one of the nurses.

## 2017-05-10 ENCOUNTER — TELEPHONE (OUTPATIENT)
Dept: CARDIOLOGY CLINIC | Age: 52
End: 2017-05-10

## 2017-05-10 DIAGNOSIS — I63.9 ACUTE CVA (CEREBROVASCULAR ACCIDENT) (HCC): Primary | ICD-10-CM

## 2017-05-10 NOTE — TELEPHONE ENCOUNTER
Patients loop implant was rescheduled for 5/17 @ 9:15am. He will need to repeat lab work. Can you please put a new order in.   Thank You

## 2017-05-11 ENCOUNTER — HOSPITAL ENCOUNTER (OUTPATIENT)
Dept: LAB | Age: 52
Discharge: HOME | End: 2017-05-11
Payer: COMMERCIAL

## 2017-05-11 DIAGNOSIS — Z01.818 PRE-OP TESTING: ICD-10-CM

## 2017-05-11 DIAGNOSIS — I63.9 ACUTE CVA (CEREBROVASCULAR ACCIDENT) (HCC): ICD-10-CM

## 2017-05-11 DIAGNOSIS — I10 ESSENTIAL HYPERTENSION: ICD-10-CM

## 2017-05-11 DIAGNOSIS — R56.9 SEIZURE (HCC): ICD-10-CM

## 2017-05-11 LAB
ALBUMIN SERPL BCP-MCNC: 3.8 G/DL (ref 3.4–5)
ALBUMIN/GLOB SERPL: 1 {RATIO} (ref 0.8–1.7)
ALP SERPL-CCNC: 82 U/L (ref 45–117)
ALT SERPL-CCNC: 35 U/L (ref 16–61)
ANION GAP BLD CALC-SCNC: 9 MMOL/L (ref 3–18)
AST SERPL W P-5'-P-CCNC: 19 U/L (ref 15–37)
BASOPHILS # BLD AUTO: 0.1 K/UL (ref 0–0.1)
BASOPHILS # BLD: 1 % (ref 0–2)
BILIRUB SERPL-MCNC: 0.3 MG/DL (ref 0.2–1)
BUN SERPL-MCNC: 11 MG/DL (ref 7–18)
BUN/CREAT SERPL: 14 (ref 12–20)
CALCIUM SERPL-MCNC: 9.2 MG/DL (ref 8.5–10.1)
CHLORIDE SERPL-SCNC: 100 MMOL/L (ref 100–108)
CO2 SERPL-SCNC: 27 MMOL/L (ref 21–32)
CREAT SERPL-MCNC: 0.77 MG/DL (ref 0.6–1.3)
DIFFERENTIAL METHOD BLD: NORMAL
EOSINOPHIL # BLD: 0.4 K/UL (ref 0–0.4)
EOSINOPHIL NFR BLD: 5 % (ref 0–5)
ERYTHROCYTE [DISTWIDTH] IN BLOOD BY AUTOMATED COUNT: 12.9 % (ref 11.6–14.5)
GLOBULIN SER CALC-MCNC: 3.8 G/DL (ref 2–4)
GLUCOSE SERPL-MCNC: 371 MG/DL (ref 74–99)
HCT VFR BLD AUTO: 43 % (ref 36–48)
HGB BLD-MCNC: 15.1 G/DL (ref 13–16)
INR PPP: 1 (ref 0.8–1.2)
LYMPHOCYTES # BLD AUTO: 29 % (ref 21–52)
LYMPHOCYTES # BLD: 2.5 K/UL (ref 0.9–3.6)
MCH RBC QN AUTO: 29.8 PG (ref 24–34)
MCHC RBC AUTO-ENTMCNC: 35.1 G/DL (ref 31–37)
MCV RBC AUTO: 85 FL (ref 74–97)
MONOCYTES # BLD: 0.5 K/UL (ref 0.05–1.2)
MONOCYTES NFR BLD AUTO: 6 % (ref 3–10)
NEUTS SEG # BLD: 5.1 K/UL (ref 1.8–8)
NEUTS SEG NFR BLD AUTO: 59 % (ref 40–73)
PLATELET # BLD AUTO: 279 K/UL (ref 135–420)
PMV BLD AUTO: 11.3 FL (ref 9.2–11.8)
POTASSIUM SERPL-SCNC: 4.4 MMOL/L (ref 3.5–5.5)
PROT SERPL-MCNC: 7.6 G/DL (ref 6.4–8.2)
PROTHROMBIN TIME: 12.5 SEC (ref 11.5–15.2)
RBC # BLD AUTO: 5.06 M/UL (ref 4.7–5.5)
SODIUM SERPL-SCNC: 136 MMOL/L (ref 136–145)
WBC # BLD AUTO: 8.5 K/UL (ref 4.6–13.2)

## 2017-05-11 PROCEDURE — 85610 PROTHROMBIN TIME: CPT

## 2017-05-11 PROCEDURE — 80053 COMPREHEN METABOLIC PANEL: CPT

## 2017-05-11 PROCEDURE — 85025 COMPLETE CBC W/AUTO DIFF WBC: CPT

## 2017-05-17 ENCOUNTER — HOSPITAL ENCOUNTER (OUTPATIENT)
Dept: CARDIAC CATH/INVASIVE PROCEDURES | Age: 52
Discharge: HOME | End: 2017-05-17
Attending: INTERNAL MEDICINE | Admitting: INTERNAL MEDICINE
Payer: COMMERCIAL

## 2017-05-17 VITALS
OXYGEN SATURATION: 97 % | WEIGHT: 219 LBS | HEART RATE: 82 BPM | DIASTOLIC BLOOD PRESSURE: 66 MMHG | SYSTOLIC BLOOD PRESSURE: 131 MMHG | BODY MASS INDEX: 35.2 KG/M2 | RESPIRATION RATE: 18 BRPM | HEIGHT: 66 IN

## 2017-05-17 PROCEDURE — C1764 EVENT RECORDER, CARDIAC: HCPCS

## 2017-05-17 PROCEDURE — 74011000250 HC RX REV CODE- 250: Performed by: INTERNAL MEDICINE

## 2017-05-17 PROCEDURE — 74011250636 HC RX REV CODE- 250/636: Performed by: INTERNAL MEDICINE

## 2017-05-17 RX ORDER — CEFAZOLIN SODIUM 2 G/50ML
2 SOLUTION INTRAVENOUS ONCE
Status: COMPLETED | OUTPATIENT
Start: 2017-05-17 | End: 2017-05-17

## 2017-05-17 RX ORDER — LIDOCAINE HYDROCHLORIDE AND EPINEPHRINE 10; 10 MG/ML; UG/ML
1.5 INJECTION, SOLUTION INFILTRATION; PERINEURAL ONCE
Status: COMPLETED | OUTPATIENT
Start: 2017-05-17 | End: 2017-05-17

## 2017-05-17 RX ADMIN — LIDOCAINE HYDROCHLORIDE,EPINEPHRINE BITARTRATE 15 MG: 10; .01 INJECTION, SOLUTION INFILTRATION; PERINEURAL at 09:31

## 2017-05-17 RX ADMIN — CEFAZOLIN SODIUM 2 G: 2 SOLUTION INTRAVENOUS at 09:30

## 2017-05-17 NOTE — H&P
Moira Brewster presented today for preprocedure history and physical. He is scheduled for a loop recorder implant on May 9, 2017.     He is a 61-year-old  male with history of a CVA involving the posterior circulation. His neurologist referred him to cardiology for implantation of a loop recorder. He denies history of atrial fibrillation but does admit to experiencing a pounding heartbeat intermittently and palpitations. He has history of diabetes mellitus, hypertension, and seizure disorder. He is followed by Dr. Saba Kirby (Neurology) in [place].     Overall, he states that he is feeling well. He denies chest pain, tightness, heaviness, and admits to occasional palpitations and the sensation of a pounding heart beat. He denies shortness of breath at rest, dyspnea on exertion, orthopnea and PND. He denies abdominal bloating. He denies lightheadedness, dizziness, and syncope. He denies lower extremity edema and claudication. Denies nausea, vomiting, diarrhea, fever, chills.     While reviewing his medications, he admitted that he has been out of his valsartan for about a week. He also states that he is out of his atorvastatin which is normally prescribed by his primary care physician. He has follow-up with his primary care physician in about a month.        PMH:       Past Medical History:   Diagnosis Date    Diabetes ([facility] [address]      Hypertension      Seizure ([facility] [address]           PSH:  History reviewed. No pertinent surgical history.     MEDS:       Current Outpatient Prescriptions   Medication Sig    valsartan (DIOVAN) 160 mg tablet Take 1 Tab by mouth daily.  metFORMIN (GLUCOPHAGE) 500 mg tablet Take 1 Tab by mouth two (2) times daily (with meals).  atorvastatin (LIPITOR) 80 mg tablet Take 1 Tab by mouth daily. Indications: prevention of cerebrovascular accident    insulin glargine (LANTUS) 100 unit/mL injection 66 Units by SubCUTAneous route nightly.     aspirin (ASPIRIN) 325 mg tablet Take 1 Tab by mouth daily. Indications: acute thromboembolic stroke    levETIRAcetam (KEPPRA) 1,000 mg tablet Take 1 Tab by mouth two (2) times a day.  gabapentin (NEURONTIN) 100 mg capsule Take 300 mg by mouth three (3) times daily.      No current facility-administered medications for this visit.          Allergies and Sensitivities:  No Known Allergies     Family History:        Family History   Problem Relation Age of Onset    Heart Surgery Mother      COPD Mother      Diabetes Mother           Social History:  He  reports that he has never smoked. He does not have any smokeless tobacco history on file. He  reports that he does not drink alcohol.        Physical:       Visit Vitals    /90 (BP 1 Location: Left arm, BP Patient Position: Sitting)    Pulse 66    Ht 5' 6\" (1.676 m)    Wt 99.3 kg (219 lb)    SpO2 95%    BMI 35.35 kg/m2            Exam:  Neck: Supple, no JVD, no carotid bruits  CV: Normal S1 and S2, no murmurs, rubs, or gallops noted  Lungs: Clear to ausculation throughout, no wheezes or rales  Abd: Soft, non-tender, non-distended with good bowel sounds. No hepatosplenomegaly  Extremities: No edema        Data:  EKG:      Normal sinus rhythm, rate 66.  LVH by limb lead voltage criteria, otherwise within normal limits.               LABS:        Lab Results   Component Value Date/Time     Sodium 138 12/25/2016 04:49 AM     Potassium 4.0 12/25/2016 04:49 AM     Chloride 104 12/25/2016 04:49 AM     CO2 26 12/25/2016 04:49 AM     Glucose 231 12/25/2016 04:49 AM     BUN 16 12/25/2016 04:49 AM     Creatinine 0.7 12/25/2016 04:49 AM            Lab Results   Component Value Date/Time     Cholesterol, total 147 12/25/2016 04:49 AM     HDL Cholesterol 36 12/25/2016 04:49 AM     LDL, calculated 78 12/25/2016 04:49 AM     Triglyceride 163 12/25/2016 04:49 AM     CHOL/HDL Ratio 4.1 12/25/2016 04:49 AM            Lab Results   Component Value Date/Time     ALT (SGPT) 19 12/25/2016 04:49 AM         Impression / Plan:  1. Crytpogenic CVA involving the posterior circulation with no residual effects  3. Hypertension, blood pressure elevated but he has been without his valsartan for about a week  4. Diabetes mellitus, recommend Hgb A1c less than 7% from cardiac standpoint     Mr. Stacy Velazquez seen today for preprocedure history and physical. He is scheduled for a loop recorder implant . He has complaints of palpitations and pounding heartbeat. Implant of the loop recorder was requested by his neurologist to assess for an arrhythmogenic etiology causing his CVA. The procedure was discussed with him, his questions were answered, and he is ready to proceed. Preprocedure instructions were reviewed with him by 1 of our nurses.     Overall, he states that he is feeling well. He does not have any residual effects from the CVA that involves the posterior circulation. He admits to occasional palpitations and the sensation of a pounding heartbeat. His blood pressure is elevated today and he admits that he has been without his valsartan for about a week. He will be prescribed a 30 day supply with 1 refill as he states that he will ask for his 90 day prescription from his primary care physician when he sees them next month. He is also out of his atorvastatin and he was also prescribed a 21 day supply (as he states that his insurance will only cover 21 days at a local pharmacy). He states that he will ask for a 90 day prescription from his PCP when he sees them early next month. No adjustments were made to his medication regimen at this time. Will proceed with implant of cardiac monitor as scheduled today.

## 2017-05-17 NOTE — DISCHARGE INSTRUCTIONS
DISCHARGE SUMMARY from Nurse    The following personal items are in your possession at time of discharge:       Visual Aid: None      PATIENT INSTRUCTIONS:    After general anesthesia or intravenous sedation, for 24 hours or while taking prescription Narcotics:  · Limit your activities  · Do not drive and operate hazardous machinery  · Do not make important personal or business decisions  · Do  not drink alcoholic beverages  · If you have not urinated within 8 hours after discharge, please contact your surgeon on call. Report the following to your surgeon:  · Excessive pain, swelling, redness or odor of or around the surgical area  · Temperature over 100.5  · Nausea and vomiting lasting longer than 4 hours or if unable to take medications  · Any signs of decreased circulation or nerve impairment to extremity: change in color, persistent  numbness, tingling, coldness or increase pain  · Any questions        What to do at Home:  Recommended activity: Activity as tolerated and no driving for today,     If you experience any of the following symptoms fever greater than 100.5, bleeding, swelling, numbness or tingling down your extremities, redness, puss from site, please follow up with emergency services. *  Please give a list of your current medications to your Primary Care Provider. *  Please update this list whenever your medications are discontinued, doses are      changed, or new medications (including over-the-counter products) are added. *  Please carry medication information at all times in case of emergency situations. These are general instructions for a healthy lifestyle:    No smoking/ No tobacco products/ Avoid exposure to second hand smoke    Surgeon General's Warning:  Quitting smoking now greatly reduces serious risk to your health.     Obesity, smoking, and sedentary lifestyle greatly increases your risk for illness    A healthy diet, regular physical exercise & weight monitoring are important for maintaining a healthy lifestyle    You may be retaining fluid if you have a history of heart failure or if you experience any of the following symptoms:  Weight gain of 3 pounds or more overnight or 5 pounds in a week, increased swelling in our hands or feet or shortness of breath while lying flat in bed. Please call your doctor as soon as you notice any of these symptoms; do not wait until your next office visit. Recognize signs and symptoms of STROKE:    F-face looks uneven    A-arms unable to move or move unevenly    S-speech slurred or non-existent    T-time-call 911 as soon as signs and symptoms begin-DO NOT go       Back to bed or wait to see if you get better-TIME IS BRAIN. Warning Signs of HEART ATTACK     Call 911 if you have these symptoms:   Chest discomfort. Most heart attacks involve discomfort in the center of the chest that lasts more than a few minutes, or that goes away and comes back. It can feel like uncomfortable pressure, squeezing, fullness, or pain.  Discomfort in other areas of the upper body. Symptoms can include pain or discomfort in one or both arms, the back, neck, jaw, or stomach.  Shortness of breath with or without chest discomfort.  Other signs may include breaking out in a cold sweat, nausea, or lightheadedness. Don't wait more than five minutes to call 911 - MINUTES MATTER! Fast action can save your life. Calling 911 is almost always the fastest way to get lifesaving treatment. Emergency Medical Services staff can begin treatment when they arrive -- up to an hour sooner than if someone gets to the hospital by car. The discharge information has been reviewed with the patient. The patient verbalized understanding. Discharge medications reviewed with the patient and appropriate educational materials and side effects teaching were provided.     Patient armband removed and shredded

## 2017-05-17 NOTE — PROGRESS NOTES
0845: Pt brought to [place] [address] ambulatory. Pt placed in bay 19 and connected to monitor. Baseline VS taken and PIV started x 1. Admission database completed. Pt ready for ordered procedure. 8972: EP team to room to perform procedure    0950: Procedure completed. Report received from Bernice, [address] [place] [address],[address]. Pt denies pain at this time. No bleeding or hematoma noted. Que from [employer] at bedside. 1020: Pt provided discharge instructions. All questions answered and pt verbalized understanding. PIV removed. No hematoma or bleeding noted at this time. Procedure site within normal limits. Pt escorted to front of building for discharge.

## 2017-05-17 NOTE — PROGRESS NOTES
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 I wonder if the neurologist is referring to a cardiac event recorder rather than an implantable loop recorder.  Will contact his office for clarification

## 2017-05-17 NOTE — PROCEDURES
[facility] CATH LAB    Name:  Fidencio Dobson  MR#:  [id number]  :  1965  Account #:  [de-identified]  Date of Adm:  2017  Date of Service:  2017      REFERRING PHYSICIAN: Jerome Claros MD.    NAME OF PROCEDURE: Implantation of Medtronic Reveal LINQ  implantable cardiac monitor. INDICATION: Cryptogenic stroke. DESCRIPTION OF PROCEDURE: The patient was brought to the  special procedure room in the cardiac catheterization holding area for  scheduled elective procedure. He was prepped and draped in the  usual sterile fashion over the left parasternal area. He received 2  grams of intravenous Cefazolin just prior to the procedure for antibiotic  prophylaxis. Once he was prepped and draped. The area was locally  anesthetized using 20 mL of 1% Xylocaine with epinephrine. A small 1  cm incision was made with an 11 blade and using the provided  insertion tool, the Medtronic Reveal LINQ implantable cardiac monitor,  serial number [id number] device was implanted into the  subcutaneous tissue just left of the sternum. The incision was closed  using a single 4-0 Monocryl suture. This was dressed in the usual  sterile fashion and the patient left the area in stable condition. COMPLICATIONS: None. ESTIMATED BLOOD LOSS: Minimal.    SPECIMENS REMOVED: None.     ANESTHESIA: MD WALE Chavez / ALFONSO  D:  2017   10:16  T:  2017   10:47  Job #:  [id number]

## 2017-05-17 NOTE — IP AVS SNAPSHOT
Chanel Khan 
 
 
 [address] Patient: Lyndon White MRN: [id number] :1965 You are allergic to the following No active allergies Recent Documentation Height Weight BMI Smoking Status 1.676 m 99.3 kg 35.35 kg/m2 Never Smoker Emergency Contacts Name Discharge Info Relation Home Work Mobile [address] CAREGIVER [3] Spouse [3]   [phone] About your hospitalization You were admitted on:  May 17, 2017 You last received care in the:  [facility] CATH HOLDING You were discharged on:  May 17, 2017 Unit phone number:  [phone] Why you were hospitalized Your primary diagnosis was:  Not on File Providers Seen During Your Hospitalizations Provider Role Specialty Primary office phone Redd Mccann MD Attending Provider Cardiology [phone] Your Primary Care Physician (PCP) Primary Care Physician Office Phone Office Fax Delvis Raymond [phone] Follow-up Information None Current Discharge Medication List  
  
ASK your doctor about these medications Dose & Instructions Dispensing Information Comments Morning Noon Evening Bedtime  
 aspirin 325 mg tablet Commonly known as:  ASPIRIN Your last dose was: Your next dose is:    
   
   
 Dose:  325 mg Take 1 Tab by mouth daily. Indications: acute thromboembolic stroke Quantity:  30 Tab Refills:  2  
     
   
   
   
  
 atorvastatin 80 mg tablet Commonly known as:  LIPITOR Your last dose was: Your next dose is:    
   
   
 Dose:  80 mg Take 1 Tab by mouth daily. Indications: prevention of cerebrovascular accident Quantity:  21 Tab Refills:  0  
     
   
   
   
  
 gabapentin 100 mg capsule Commonly known as:  NEURONTIN Your last dose was: Your next dose is: Dose:  300 mg Take 300 mg by mouth three (3) times daily. Refills:  0  
     
   
   
   
  
 insulin glargine 100 unit/mL injection Commonly known as:  LANTUS Your last dose was: Your next dose is:    
   
   
 Dose:  66 Units 66 Units by SubCUTAneous route nightly. Quantity:  1 Vial  
Refills:  0  
     
   
   
   
  
 levETIRAcetam 1,000 mg tablet Commonly known as:  KEPPRA Your last dose was: Your next dose is:    
   
   
 Dose:  1000 mg Take 1 Tab by mouth two (2) times a day. Quantity:  60 Tab Refills:  0  
     
   
   
   
  
 metFORMIN 500 mg tablet Commonly known as:  GLUCOPHAGE Your last dose was: Your next dose is:    
   
   
 Dose:  500 mg Take 1 Tab by mouth two (2) times daily (with meals). Quantity:  1 Tab Refills:  0  
     
   
   
   
  
 valsartan 160 mg tablet Commonly known as:  DIOVAN Your last dose was: Your next dose is:    
   
   
 Dose:  160 mg Take 1 Tab by mouth daily. Quantity:  30 Tab Refills:  1 Discharge Instructions DISCHARGE SUMMARY from Nurse The following personal items are in your possession at time of discharge: 
 
  
Visual Aid: None PATIENT INSTRUCTIONS: 
 
 
F-face looks uneven A-arms unable to move or move unevenly S-speech slurred or non-existent T-time-call 911 as soon as signs and symptoms begin-DO NOT go Back to bed or wait to see if you get better-TIME IS BRAIN. Warning Signs of HEART ATTACK Call 911 if you have these symptoms: ? Chest discomfort. Most heart attacks involve discomfort in the center of the chest that lasts more than a few minutes, or that goes away and comes back. It can feel like uncomfortable pressure, squeezing, fullness, or pain. ? Discomfort in other areas of the upper body. Symptoms can include pain or discomfort in one or both arms, the back, neck, jaw, or stomach. ? Shortness of breath with or without chest discomfort. ? Other signs may include breaking out in a cold sweat, nausea, or lightheadedness. Don't wait more than five minutes to call [address]! Fast action can save your life. Calling 911 is almost always the fastest way to get lifesaving treatment. Emergency Medical Services staff can begin treatment when they arrive  up to an hour sooner than if someone gets to the hospital by car. The discharge information has been reviewed with the patient. The patient verbalized understanding. Discharge medications reviewed with the patient and appropriate educational materials and side effects teaching were provided. Patient armband removed and shredded Discharge Orders None Introducing [facility]! Dear Gina Lund: Thank you for requesting a [employer] account. Our records indicate that you already have an active [employer] account. You can access your account anytime at [email or web address] Did you know that you can access your hospital and ER discharge instructions at any time in [employer]? You can also review all of your test results from your hospital stay or ER visit. Additional Information If you have questions, please visit the Frequently Asked Questions section of the [employer] website at [email or web address] Remember, [employer] is NOT to be used for urgent needs. For medical emergencies, dial 911. Now available from your iPhone and Android! General Information Please provide this summary of care documentation to your next provider. Patient Signature:  ____________________________________________________________ Date:  ____________________________________________________________  
  
Graciela Lightning Provider Signature:  ____________________________________________________________ Date:  ____________________________________________________________

## 2017-07-11 ENCOUNTER — TELEPHONE (OUTPATIENT)
Dept: CARDIOLOGY CLINIC | Age: 52
End: 2017-07-11

## 2017-11-17 ENCOUNTER — OFFICE VISIT (OUTPATIENT)
Dept: CARDIOLOGY CLINIC | Age: 52
End: 2017-11-17

## 2017-11-17 VITALS
DIASTOLIC BLOOD PRESSURE: 80 MMHG | OXYGEN SATURATION: 95 % | HEART RATE: 78 BPM | SYSTOLIC BLOOD PRESSURE: 128 MMHG | BODY MASS INDEX: 34.87 KG/M2 | HEIGHT: 66 IN | WEIGHT: 217 LBS

## 2017-11-17 DIAGNOSIS — I63.9 CRYPTOGENIC STROKE (HCC): Primary | ICD-10-CM

## 2017-11-17 NOTE — MR AVS SNAPSHOT
Visit Information Date & Time Provider Department Dept. Phone Encounter #  
 11/17/2017 10:00 AM Kaia Cannon MD [facility] [id number] Upcoming Health Maintenance Date Due  
 FOOT EXAM Q1 6/28/1976 MICROALBUMIN Q1 6/28/1976 EYE EXAM RETINAL OR DILATED Q1 6/28/1976 Pneumococcal 19-64 Medium Risk (1 of 1 - PPSV23) 6/28/1985 DTaP/Tdap/Td series (1 - Tdap) 6/28/1987 FOBT Q 1 YEAR AGE 50-75 6/28/2016 HEMOGLOBIN A1C Q6M 6/24/2017 Influenza Age 5 to Adult 8/1/2017 LIPID PANEL Q1 12/25/2017 Allergies as of 11/17/2017  Review Complete On: 11/17/2017 By: Minal Gerardo No Known Allergies Current Immunizations  Never Reviewed No immunizations on file. Not reviewed this visit You Were Diagnosed With   
  
 Codes Comments Cryptogenic stroke ([facility]    -  Primary ICD-10-CM: I63.9 ICD-9-CM: 434.91 Vitals BP Pulse Height(growth percentile) Weight(growth percentile) SpO2 BMI  
 128/80 (BP 1 Location: Left arm, BP Patient Position: Sitting) 78 5' 6\" (1.676 m) 217 lb (98.4 kg) 95% 35.02 kg/m2 Smoking Status Never Smoker Vitals History BMI and BSA Data Body Mass Index Body Surface Area 35.02 kg/m 2 2.14 m 2 Preferred Pharmacy Pharmacy Name Phone [facility] [address] [phone] Your Updated Medication List  
  
   
This list is accurate as of: 11/17/17 10:42 AM.  Always use your most recent med list.  
  
  
  
  
 aspirin 325 mg tablet Commonly known as:  ASPIRIN Take 1 Tab by mouth daily. Indications: acute thromboembolic stroke  
  
 atorvastatin 80 mg tablet Commonly known as:  LIPITOR Take 1 Tab by mouth daily. Indications: prevention of cerebrovascular accident  
  
 insulin glargine 100 unit/mL injection Commonly known as:  LANTUS  
66 Units by SubCUTAneous route nightly. levETIRAcetam 1,000 mg tablet Commonly known as:  KEPPRA Take 1 Tab by mouth two (2) times a day. metFORMIN 500 mg tablet Commonly known as:  GLUCOPHAGE Take 1 Tab by mouth two (2) times daily (with meals). valsartan 160 mg tablet Commonly known as:  DIOVAN Take 1 Tab by mouth daily. We Performed the Following AMB POC EKG ROUTINE W/ 12 LEADS, INTER & REP [87866 CPT(R)] Introducing [address]! Dear Cesar Beltran: Thank you for requesting a [employer] account. Our records indicate that you already have an active [employer] account. You can access your account anytime at [email or web address] Did you know that you can access your hospital and ER discharge instructions at any time in [employer]? You can also review all of your test results from your hospital stay or ER visit. Additional Information If you have questions, please visit the Frequently Asked Questions section of the [employer] website at [email or web address] Remember, [employer] is NOT to be used for urgent needs. For medical emergencies, dial 911. Now available from your iPhone and Android! Please provide this summary of care documentation to your next provider. Your primary care clinician is listed as Mook Lange. If you have any questions after today's visit, please call [phone].

## 2017-11-17 NOTE — PROGRESS NOTES
1. Have you been to the ER, urgent care clinic since your last visit? Hospitalized since your last visit? S/p loop recorder implant 5/17/17  2. Have you seen or consulted any other health care providers outside of the [address] [place] [address] since your last visit? Include any pap smears or colon screening.   no

## 2017-11-19 NOTE — PROGRESS NOTES
PATIENT NAME: Fadi Hu         47 y.o.      1965              DATE:11/17/2017    REASON FOR VISIT: Follow-up on cerebrovascular accident. HISTORY OF PRESENT ILLNESS:Denies chest pain and other symptoms suggestive of angina. Denies FORDE, PND, orthopnea. Denies palpitation, syncope, presyncope. Denies edema in the lower extremities. Loop recorder downloaded today. No serious arrhythmias. PAST MEDICAL HISTORY:   Past Medical History:  No date: Diabetes ([facility]  No date: Hypertension  No date: Seizure ([facility]    PAST SURGICAL HISTORY:   No past surgical history on file. SOCIAL HISTORY:  Social History    Marital status:              Spouse name:                       Years of education:                 Number of children:               Social History Main Topics    Smoking status: Never Smoker                                                                Alcohol use: No                 Comment: Occasionally      ALLERGIES:   No Known Allergies     CURRENT MEDICATIONS:   Current Outpatient Prescriptions:  valsartan (DIOVAN) 160 mg tablet, Take 1 Tab by mouth daily. metFORMIN (GLUCOPHAGE) 500 mg tablet, Take 1 Tab by mouth two (2) times daily (with meals). atorvastatin (LIPITOR) 80 mg tablet, Take 1 Tab by mouth daily. Indications: prevention of cerebrovascular accident  insulin glargine (LANTUS) 100 unit/mL injection, 66 Units by SubCUTAneous route nightly. aspirin (ASPIRIN) 325 mg tablet, Take 1 Tab by mouth daily. Indications: acute thromboembolic stroke  levETIRAcetam (KEPPRA) 1,000 mg tablet, Take 1 Tab by mouth two (2) times a day. No current facility-administered medications for this visit.        REVIEW of SYSTEMS:Cardiovascular ROS: See history of present illness     PHYSICAL EXAMINATION:   /80 (BP 1 Location: Left arm, BP Patient Position: Sitting)  Pulse 78  Ht 5' 6\" (1.676 m)  Wt 98.4 kg (217 lb)  SpO2 95%  BMI 35.02 kg/m2  BP Readings from Last 3 Encounters:  11/17/17 : 128/80  05/17/17 : 131/66  05/02/17 : 146/90    Pulse Readings from Last 3 Encounters:  11/17/17 : 78  05/17/17 : 82  05/02/17 : 66    Wt Readings from Last 3 Encounters:  11/17/17 : 98.4 kg (217 lb)  05/17/17 : 99.3 kg (219 lb)  05/02/17 : 99.3 kg (219 lb)    Neck: No jugular venous distention. No bruits. Chest: Clear to auscultation. Heart: Regular rhythm. No murmur or gallop. Extremities: No edema    Loop recorder download. No serious arrhythmias    IMPRESSION:   Status post cryptogenic   No arrhythmias    PLAN:  No changes made. Downloads as scheduled    The diagnoses and plan were discussed with patient. All questions answered. Plan of care agreed to by all concerned. Mat Franklin MD       ,

## 2018-02-14 ENCOUNTER — OFFICE VISIT (OUTPATIENT)
Dept: CARDIOLOGY CLINIC | Age: 53
End: 2018-02-14

## 2018-02-14 DIAGNOSIS — I63.9 ACUTE CVA (CEREBROVASCULAR ACCIDENT) (HCC): Primary | ICD-10-CM

## 2018-02-14 NOTE — PROGRESS NOTES
I have personally seen and evaluated the device findings. Interrogation reviewed and I agree with assessment.     Yang Pool

## 2018-05-17 ENCOUNTER — OFFICE VISIT (OUTPATIENT)
Dept: CARDIOLOGY CLINIC | Age: 53
End: 2018-05-17

## 2018-05-17 VITALS
BODY MASS INDEX: 34.87 KG/M2 | WEIGHT: 217 LBS | HEIGHT: 66 IN | OXYGEN SATURATION: 96 % | DIASTOLIC BLOOD PRESSURE: 82 MMHG | HEART RATE: 89 BPM | SYSTOLIC BLOOD PRESSURE: 128 MMHG

## 2018-05-17 DIAGNOSIS — I10 ESSENTIAL HYPERTENSION: Primary | ICD-10-CM

## 2018-05-17 DIAGNOSIS — I63.9 CRYPTOGENIC STROKE (HCC): ICD-10-CM

## 2018-05-17 DIAGNOSIS — E78.5 DYSLIPIDEMIA: ICD-10-CM

## 2018-05-17 DIAGNOSIS — E11.9 CONTROLLED TYPE 2 DIABETES MELLITUS WITHOUT COMPLICATION, WITHOUT LONG-TERM CURRENT USE OF INSULIN (HCC): ICD-10-CM

## 2018-05-17 NOTE — PROGRESS NOTES
1. Have you been to the ER, urgent care clinic since your last visit? Hospitalized since your last visit? No    2. Have you seen or consulted any other health care providers outside of the [address] [place] [address] since your last visit? Include any pap smears or colon screening.  No

## 2018-05-17 NOTE — PROGRESS NOTES
HISTORY OF PRESENT ILLNESS  Leslye Nevarez is a 47 y.o. male. HPI    Patient presents for a follow-up office visit. He was previously followed by Dr. Lori Morgan. He has a past medical history significant for long-standing hypertension, diabetes mellitus, type II, dyslipidemia, and a cryptogenic CVA in 2016. Patient ultimately underwent implantation of a cardiac loop recorder in May 2017. He underwent an echocardiogram and a carotid duplex scan at the end of 2016 both of which were unremarkable. He was last seen in our office 6 months ago. Since that time his been feeling relatively well. He does notice occasional heart palpitations which feels like a thumping sensation in his chest.  This can last up to an hour at a time. He did activate his device during 1 of these episodes. He denies any dizziness, syncope or near syncope. No major change in his activity level. No shortness of breath, orthopnea, PND, leg swelling or claudication. No new chest pain or pressure. Past Medical History:   Diagnosis Date    CVA (cerebrovascular accident) ([facility] 2016    Diabetes ([facility]     H/O echocardiogram 12/2016    EF 60%, mild concentric LVH, mild LAE    Hypertension     Seizure ([facility]     Status post placement of implantable loop recorder 05/2017    Medtronic Reveal LINQ     Current Outpatient Prescriptions   Medication Sig Dispense Refill    empagliflozin (JARDIANCE PO) Take  by mouth.  valsartan (DIOVAN) 160 mg tablet Take 1 Tab by mouth daily. 30 Tab 1    metFORMIN (GLUCOPHAGE) 500 mg tablet Take 1 Tab by mouth two (2) times daily (with meals). (Patient taking differently: Take 1,000 mg by mouth two (2) times daily (with meals). ) 1 Tab 0    atorvastatin (LIPITOR) 80 mg tablet Take 1 Tab by mouth daily. Indications: prevention of cerebrovascular accident 24 Tab 0    insulin glargine (LANTUS) 100 unit/mL injection 66 Units by SubCUTAneous route nightly.  (Patient taking differently: 30 Units by SubCUTAneous route two (2) times a day.) 1 Vial 0    aspirin (ASPIRIN) 325 mg tablet Take 1 Tab by mouth daily. Indications: acute thromboembolic stroke 30 Tab 2    levETIRAcetam (KEPPRA) 1,000 mg tablet Take 1 Tab by mouth two (2) times a day. 61 Tab 0     No Known Allergies     Social History   Substance Use Topics    Smoking status: Never Smoker    Smokeless tobacco: None    Alcohol use No      Comment: Occasionally     Family History   Problem Relation Age of Onset    Heart Surgery Mother     COPD Mother     Diabetes Mother          Review of Systems   Constitutional: Negative for chills, fever and weight loss. HENT: Negative for nosebleeds. Eyes: Negative for blurred vision and double vision. Respiratory: Negative for cough, shortness of breath and wheezing. Cardiovascular: Positive for palpitations. Negative for chest pain, orthopnea, claudication, leg swelling and PND. Gastrointestinal: Negative for abdominal pain, heartburn, nausea and vomiting. Genitourinary: Negative for dysuria and hematuria. Musculoskeletal: Negative for falls and myalgias. Skin: Negative for rash. Neurological: Negative for dizziness, focal weakness and headaches. Endo/Heme/Allergies: Does not bruise/bleed easily. Psychiatric/Behavioral: Negative for substance abuse. Visit Vitals    /82    Pulse 89    Ht 5' 6\" (1.676 m)    Wt 98.4 kg (217 lb)    SpO2 96%    BMI 35.02 kg/m2       Physical Exam   Constitutional: He is oriented to person, place, and time. He appears well-developed and well-nourished. HENT:   Head: Normocephalic and atraumatic. Eyes: Conjunctivae are normal.   Neck: Neck supple. No JVD present. Carotid bruit is not present. Cardiovascular: Normal rate, regular rhythm, S1 normal, S2 normal and normal pulses. Exam reveals no gallop and no S3. No murmur heard. Pulmonary/Chest: Breath sounds normal. He has no wheezes. He has no rales. Abdominal: Soft.  Bowel sounds are normal. There is no tenderness. Musculoskeletal: He exhibits no edema. Neurological: He is alert and oriented to person, place, and time. Skin: Skin is warm and dry. EKG: Normal sinus rhythm, normal axis, normal QTc interval, no ST/T wave abnormalities concerning for ischemia. No change compared to the previous tracing. Loop recorder interrogation. Normal functioning device. One symptom corresponded to sinus rhythm with a heart rate in the 90s. No atrial fibrillation identified. No pauses, no bradycardias. ASSESSMENT and PLAN  Encounter Diagnoses   Name Primary?  Essential hypertension Yes    Cryptogenic stroke ([facility]     Dyslipidemia     Controlled type 2 diabetes mellitus without complication, without long-term current use of insulin ([facility])      Heart palpitations. No significant arrhythmia identified on his loop recorder interrogation today. He did activate the loop recorder a single time since last interrogation which occurred earlier this month. Patient was in sinus rhythm with a normal heart rate at the time. This is likely noncardiac in nature. Cryptogenic CVA. No evidence of atrial fibrillation seen on his loop recorder which is implanted 1 year ago. Essential hypertension. Patient's blood pressure appears to be reasonably well-controlled on his current medical regimen, all of which I would continue. Dyslipidemia. Patient has been treated with a high-dose potent statin. This is followed by his PCP. Diabetes mellitus, type II. Patient is managed with oral agents and insulin. His hemoglobin A1c should be less than 7 from a cardiac standpoint.     Follow-up in 6 months, sooner as needed

## 2018-05-17 NOTE — MR AVS SNAPSHOT
[address] [facility] [address] [address] [address] [phone] Patient: Kenya Hanna MRN: [id number] :1965 Visit Information Date & Time Provider Department Dept. Phone Encounter #  
 2018  2:40 PM Tanya Palacios MD [facility] [id number] Upcoming Health Maintenance Date Due  
 FOOT EXAM Q1 1976 MICROALBUMIN Q1 1976 EYE EXAM RETINAL OR DILATED Q1 1976 Pneumococcal 19-64 Medium Risk (1 of 1 - PPSV23) 1985 DTaP/Tdap/Td series (1 - Tdap) 1987 FOBT Q 1 YEAR AGE 50-75 2016 HEMOGLOBIN A1C Q6M 2017 LIPID PANEL Q1 2017 Influenza Age 5 to Adult 2018 Allergies as of 2018  Review Complete On: 2017 By: Jennifer Urias No Known Allergies Current Immunizations  Never Reviewed No immunizations on file. Not reviewed this visit You Were Diagnosed With   
  
 Codes Comments Essential hypertension    -  Primary ICD-10-CM: I10 
ICD-9-CM: 401.9 Acute CVA (cerebrovascular accident) ([facility]     ICD-10-CM: I63.9 ICD-9-CM: 434.91 Vitals BP Pulse Height(growth percentile) Weight(growth percentile) SpO2 BMI  
 128/82 89 5' 6\" (1.676 m) 217 lb (98.4 kg) 96% 35.02 kg/m2 Smoking Status Never Smoker Vitals History BMI and BSA Data Body Mass Index Body Surface Area 35.02 kg/m 2 2.14 m 2 Preferred Pharmacy Pharmacy Name Phone [facility] [address] [phone] Your Updated Medication List  
  
   
This list is accurate as of 18  3:13 PM.  Always use your most recent med list.  
  
  
  
  
 aspirin 325 mg tablet Commonly known as:  ASPIRIN Take 1 Tab by mouth daily. Indications: acute thromboembolic stroke  
  
 atorvastatin 80 mg tablet Commonly known as:  LIPITOR Take 1 Tab by mouth daily. Indications: prevention of cerebrovascular accident  
  
 insulin glargine 100 unit/mL injection Commonly known as:  LANTUS U-100 INSULIN  
66 Units by SubCUTAneous route nightly. JARDIANCE PO Take  by mouth.  
  
 levETIRAcetam 1,000 mg tablet Commonly known as:  KEPPRA Take 1 Tab by mouth two (2) times a day. metFORMIN 500 mg tablet Commonly known as:  GLUCOPHAGE Take 1 Tab by mouth two (2) times daily (with meals). valsartan 160 mg tablet Commonly known as:  DIOVAN Take 1 Tab by mouth daily. We Performed the Following AMB POC EKG ROUTINE W/ 12 LEADS, INTER & REP [67953 CPT(R)] Introducing [facility]! Dear Prakash Holm: Thank you for requesting a [employer] account. Our records indicate that you already have an active [employer] account. You can access your account anytime at [email or web address] Did you know that you can access your hospital and ER discharge instructions at any time in [employer]? You can also review all of your test results from your hospital stay or ER visit. Additional Information If you have questions, please visit the Frequently Asked Questions section of the [employer] website at [email or web address] Remember, [employer] is NOT to be used for urgent needs. For medical emergencies, dial 911. Now available from your iPhone and Android! Please provide this summary of care documentation to your next provider. Your primary care clinician is listed as Trinidad Baldwin. If you have any questions after today's visit, please call [phone].

## 2018-09-25 ENCOUNTER — TELEPHONE (OUTPATIENT)
Dept: CARDIOLOGY CLINIC | Age: 53
End: 2018-09-25

## 2018-11-04 NOTE — PROGRESS NOTES
Eb Singh presents today to discuss explant of his Medtronic Linq loop recorder. It was implanted on May 9, 2017, to evaluate for atrial fibrillation as he had a cryptogenic stroke in 2016. He is a 47year-old  male with history of a CVA involving the posterior circulation. His neurologist referred him to cardiology for implantation of a loop recorder. He denies history of atrial fibrillation but does admit to experiencing a pounding heartbeat intermittently and palpitations. He has history of diabetes mellitus, hypertension, and seizure disorder. He is followed by Dr. René Raman (Neurology) in [place]. Overall, he states that he is feeling well. He denies chest pain, tightness, heaviness, and admits to occasional palpitations and the sensation of a pounding heart beat. He denies shortness of breath at rest, dyspnea on exertion, orthopnea and PND. He denies abdominal bloating. He denies lightheadedness, dizziness, and syncope. He denies lower extremity edema and claudication. Denies nausea, vomiting, diarrhea, fever, chills. PMH:  Past Medical History:   Diagnosis Date    [facility]A [facility]cerebrovascular [facility]) ([facility] [address] 2016    Diabetes ([facility] [address]     H/O echocardiogram 12/2016    EF 60%, mild concentric LVH, mild LAE    Hypertension     Seizure ([facility] [address]     Status post placement of implantable loop recorder 05/2017    Medtronic Reveal LINQ       PSH:  No past surgical history on file. MEDS:  Current Outpatient Medications   Medication Sig    glimepiride (AMARYL) 2 mg tablet Take 2 mg by mouth two (2) times a day.  gabapentin (NEURONTIN) 600 mg tablet Take 600 mg by mouth two (2) times a day.  losartan (COZAAR) 100 mg tablet     empagliflozin (JARDIANCE PO) Take  by mouth daily.  metFORMIN (GLUCOPHAGE) 500 mg tablet Take 1 Tab by mouth two (2) times daily (with meals). (Patient taking differently: Take 1,000 mg by mouth two (2) times daily (with meals). )    atorvastatin (LIPITOR) 80 mg tablet Take 1 Tab by mouth daily. Indications: prevention of cerebrovascular accident    insulin glargine (LANTUS) 100 unit/mL injection 66 Units by SubCUTAneous route nightly. (Patient taking differently: 30 Units by SubCUTAneous route two (2) times a day.)    aspirin (ASPIRIN) 325 mg tablet Take 1 Tab by mouth daily. Indications: acute thromboembolic stroke    levETIRAcetam (KEPPRA) 1,000 mg tablet Take 1 Tab by mouth two (2) times a day. No current facility-administered medications for this visit. Allergies and Sensitivities:  No Known Allergies    Family History:  Family History   Problem Relation Age of Onset    Heart Surgery Mother     COPD Mother     Diabetes Mother        Social History:  He  reports that  has never smoked. He does not have any smokeless tobacco history on file. He  reports that he does not drink alcohol. Physical:  Visit Vitals  /84   Pulse 68   Ht 5' 6\" (1.676 m)   Wt 102.1 kg (225 lb)   SpO2 99%   BMI 36.32 kg/m²       Exam:  Neck:  Supple, no JVD, no carotid bruits  CV:  Normal S1 and  S2, no murmurs, rubs, or gallops noted  Lungs:  Clear to ausculation throughout, no wheezes or rales  Abd:  Soft, non-tender, non-distended with good bowel sounds.   No hepatosplenomegaly  Extremities:  No edema      Data:  EKG:   Read by Cesar Copeland MD - Sinus rhythm.  Poor R-wave progression, nonspecific, consider old anterior infarct      LABS:  Lab Results   Component Value Date/Time    Sodium 136 05/11/2017 02:20 PM    Potassium 4.4 05/11/2017 02:20 PM    Chloride 100 05/11/2017 02:20 PM    CO2 27 05/11/2017 02:20 PM    Glucose 371 (H) 05/11/2017 02:20 PM    BUN 11 05/11/2017 02:20 PM    Creatinine 0.77 05/11/2017 02:20 PM     Lab Results   Component Value Date/Time    Cholesterol, total 147 12/25/2016 04:49 AM    HDL Cholesterol 36 (L) 12/25/2016 04:49 AM    LDL, calculated 78 12/25/2016 04:49 AM    Triglyceride 163 (H) 12/25/2016 04:49 AM    CHOL/HDL Ratio 4.1 12/25/2016 04:49 AM     Lab Results   Component Value Date/Time    ALT (SGPT) 35 05/11/2017 02:20 PM       Impression / Plan:  1. History of Palpitations  2. History of cryptogenic CVA involving the posterior circulation with no residual effects  3. Hypertension, blood pressure  4. Diabetes mellitus, recommend Hgb A1c less than 7% from cardiac standpoint  5. Medtronic Linq loop recorder, no documented arrhythmias or atrial fibrillation    Mr. Yokasta Pendleton seen today for preprocedure history and physical.  He is scheduled for a loop recorder explant on 11/15/18. He states that he would like to have the device explanted due to some possible changes with his insurance coverage at the beginning of the new year. His blood pressure and heart rate are stable. He is in sinus rhythm. He does not exhibit any signs of volume overload. The procedure was discussed with him, his questions were answered, and he is ready to proceed. Pre-procedure instructions were reviewed with him by one of our nurses. Overall, he states that he is feeling well. He does not have any residual effects from the CVA that involves the posterior circulation. He admits to the sensation of a pounding/thumping heartbeat but no irregular or skipped beats. He will follow-up with Dr. Yony Carr as scheduled and as needed. Noah Hawley MSN, FNP-BC    Please note:  Portions of this chart were created with Dragon medical speech to text program.  Unrecognized errors may be present.

## 2018-11-06 ENCOUNTER — OFFICE VISIT (OUTPATIENT)
Dept: CARDIOLOGY CLINIC | Age: 53
End: 2018-11-06

## 2018-11-06 VITALS
DIASTOLIC BLOOD PRESSURE: 84 MMHG | HEART RATE: 68 BPM | OXYGEN SATURATION: 99 % | BODY MASS INDEX: 36.16 KG/M2 | SYSTOLIC BLOOD PRESSURE: 128 MMHG | WEIGHT: 225 LBS | HEIGHT: 66 IN

## 2018-11-06 DIAGNOSIS — I63.9 CRYPTOGENIC STROKE (HCC): Primary | ICD-10-CM

## 2018-11-06 DIAGNOSIS — I10 ESSENTIAL HYPERTENSION: ICD-10-CM

## 2018-11-06 DIAGNOSIS — R56.9 SEIZURE (HCC): ICD-10-CM

## 2018-11-06 PROBLEM — E66.01 SEVERE OBESITY (HCC): Status: ACTIVE | Noted: 2018-11-06

## 2018-11-06 RX ORDER — GABAPENTIN 600 MG/1
600 TABLET ORAL 2 TIMES DAILY
COMMUNITY
Start: 2018-09-07

## 2018-11-06 RX ORDER — GLIMEPIRIDE 2 MG/1
2 TABLET ORAL 2 TIMES DAILY
COMMUNITY
Start: 2018-09-17

## 2018-11-06 RX ORDER — LOSARTAN POTASSIUM 100 MG/1
TABLET ORAL
COMMUNITY
Start: 2018-09-17

## 2018-11-06 NOTE — PATIENT INSTRUCTIONS
[facility]          Patient  EP Instructions                  1. You are scheduled to have a Loop Recorder Explant on  November 15, 2018 , at 8:00 am.    Please check in at 7:00 am.    2. Please go to [facility] and michelle in the outpatient parking lot that is located around to the back of the hospital and enter through the [facility]. Once you enter through the [facility] check in with the  there. The  will either give you directions or assist you in getting to the cath holding area. 3.  [x]       You are not to eat or drink anything after midnight the morning of the               procedure. 4. Please continue to take your medications with a small sip of water on the morning of the procedure. 5. If you are diabetic, do not take your insulin/sugar pill the morning of the procedure. 6. We encourage families to wait in the waiting room on the first floor while the procedure is being done. The Doctor will come out and talk with you as soon as the procedure is over. 7. There is the possibility that you may spend the night in the hospital, depending on the results of the procedure. This will be determined after the procedure is done. 8.   If you or your family have any questions, please call our office Monday-Friday 9:00am         -4:30 pm , at [phone], and ask to speak to one of the nurses.

## 2018-11-23 ENCOUNTER — HOSPITAL ENCOUNTER (OUTPATIENT)
Dept: LAB | Age: 53
Discharge: HOME | End: 2018-11-23
Payer: COMMERCIAL

## 2018-11-23 DIAGNOSIS — I63.9 CRYPTOGENIC STROKE (HCC): ICD-10-CM

## 2018-11-23 DIAGNOSIS — I10 ESSENTIAL HYPERTENSION: ICD-10-CM

## 2018-11-23 LAB
ALBUMIN SERPL-MCNC: 4 G/DL (ref 3.4–5)
ALBUMIN/GLOB SERPL: 1.1 {RATIO} (ref 0.8–1.7)
ALP SERPL-CCNC: 69 U/L (ref 45–117)
ALT SERPL-CCNC: 41 U/L (ref 16–61)
ANION GAP SERPL CALC-SCNC: 4 MMOL/L (ref 3–18)
AST SERPL-CCNC: 25 U/L (ref 15–37)
BASOPHILS # BLD: 0.1 K/UL (ref 0–0.1)
BASOPHILS NFR BLD: 1 % (ref 0–2)
BILIRUB SERPL-MCNC: 0.3 MG/DL (ref 0.2–1)
BUN SERPL-MCNC: 15 MG/DL (ref 7–18)
BUN/CREAT SERPL: 20 (ref 12–20)
CALCIUM SERPL-MCNC: 8.8 MG/DL (ref 8.5–10.1)
CHLORIDE SERPL-SCNC: 109 MMOL/L (ref 100–108)
CO2 SERPL-SCNC: 26 MMOL/L (ref 21–32)
CREAT SERPL-MCNC: 0.74 MG/DL (ref 0.6–1.3)
DIFFERENTIAL METHOD BLD: NORMAL
EOSINOPHIL # BLD: 0.2 K/UL (ref 0–0.4)
EOSINOPHIL NFR BLD: 2 % (ref 0–5)
ERYTHROCYTE [DISTWIDTH] IN BLOOD BY AUTOMATED COUNT: 13.1 % (ref 11.6–14.5)
GLOBULIN SER CALC-MCNC: 3.7 G/DL (ref 2–4)
GLUCOSE SERPL-MCNC: 107 MG/DL (ref 74–99)
HCT VFR BLD AUTO: 46 % (ref 36–48)
HGB BLD-MCNC: 15.4 G/DL (ref 13–16)
INR PPP: 0.9 (ref 0.8–1.2)
LYMPHOCYTES # BLD: 2.6 K/UL (ref 0.9–3.6)
LYMPHOCYTES NFR BLD: 30 % (ref 21–52)
MCH RBC QN AUTO: 28.7 PG (ref 24–34)
MCHC RBC AUTO-ENTMCNC: 33.5 G/DL (ref 31–37)
MCV RBC AUTO: 85.7 FL (ref 74–97)
MONOCYTES # BLD: 0.6 K/UL (ref 0.05–1.2)
MONOCYTES NFR BLD: 7 % (ref 3–10)
NEUTS SEG # BLD: 5.1 K/UL (ref 1.8–8)
NEUTS SEG NFR BLD: 60 % (ref 40–73)
PLATELET # BLD AUTO: 264 K/UL (ref 135–420)
PMV BLD AUTO: 10.7 FL (ref 9.2–11.8)
POTASSIUM SERPL-SCNC: 4.6 MMOL/L (ref 3.5–5.5)
PROT SERPL-MCNC: 7.7 G/DL (ref 6.4–8.2)
PROTHROMBIN TIME: 11.7 SEC (ref 11.5–15.2)
RBC # BLD AUTO: 5.37 M/UL (ref 4.7–5.5)
SODIUM SERPL-SCNC: 139 MMOL/L (ref 136–145)
WBC # BLD AUTO: 8.6 K/UL (ref 4.6–13.2)

## 2018-11-23 PROCEDURE — 85025 COMPLETE CBC W/AUTO DIFF WBC: CPT

## 2018-11-23 PROCEDURE — 85610 PROTHROMBIN TIME: CPT

## 2018-11-23 PROCEDURE — 36415 COLL VENOUS BLD VENIPUNCTURE: CPT

## 2018-11-23 PROCEDURE — 80053 COMPREHEN METABOLIC PANEL: CPT

## 2018-11-27 ENCOUNTER — HOSPITAL ENCOUNTER (OUTPATIENT)
Age: 53
Setting detail: OUTPATIENT SURGERY
Discharge: HOME | End: 2018-11-27
Attending: INTERNAL MEDICINE | Admitting: INTERNAL MEDICINE
Payer: COMMERCIAL

## 2018-11-27 VITALS
HEART RATE: 80 BPM | OXYGEN SATURATION: 95 % | RESPIRATION RATE: 19 BRPM | WEIGHT: 224 LBS | BODY MASS INDEX: 36 KG/M2 | DIASTOLIC BLOOD PRESSURE: 83 MMHG | HEIGHT: 66 IN | SYSTOLIC BLOOD PRESSURE: 147 MMHG

## 2018-11-27 DIAGNOSIS — R00.2 PALPITATIONS: ICD-10-CM

## 2018-11-27 LAB — GLUCOSE BLD STRIP.AUTO-MCNC: 159 MG/DL (ref 70–110)

## 2018-11-27 PROCEDURE — 33284 HC RMVL PT CARD EVENT RECORD: CPT | Performed by: INTERNAL MEDICINE

## 2018-11-27 PROCEDURE — 77030031139 HC SUT VCRL2 J&J -A: Performed by: INTERNAL MEDICINE

## 2018-11-27 PROCEDURE — 74011000250 HC RX REV CODE- 250: Performed by: INTERNAL MEDICINE

## 2018-11-27 PROCEDURE — 82962 GLUCOSE BLOOD TEST: CPT

## 2018-11-27 RX ORDER — LIDOCAINE HYDROCHLORIDE AND EPINEPHRINE 10; 10 MG/ML; UG/ML
INJECTION, SOLUTION INFILTRATION; PERINEURAL AS NEEDED
Status: DISCONTINUED | OUTPATIENT
Start: 2018-11-27 | End: 2018-11-27 | Stop reason: HOSPADM

## 2018-11-27 RX ORDER — CEFAZOLIN SODIUM 2 G/50ML
2 SOLUTION INTRAVENOUS ONCE
Status: DISCONTINUED | OUTPATIENT
Start: 2018-11-27 | End: 2018-11-27 | Stop reason: HOSPADM

## 2018-11-27 NOTE — Clinical Note
TRANSFER - OUT REPORT:  
 
Verbal report given to: Curt Augustine. Report consisted of patient's Situation, Background, Assessment and  
Recommendations(SBAR). Opportunity for questions and clarification was provided. Patient transported with a Cardiac Cath Tech / Patient Care Tech. Patient transported to: [address].

## 2018-11-27 NOTE — H&P
HISTORY OF PRESENT ILLNESS  Gary Schneider is a 47 y.o. male.     HPI     Patient presents for a follow-up office visit. He was previously followed by Dr. Chintan Phan. He has a past medical history significant for long-standing hypertension, diabetes mellitus, type II, dyslipidemia, and a cryptogenic CVA in 2016. Patient ultimately underwent implantation of a cardiac loop recorder in May 2017. He underwent an echocardiogram and a carotid duplex scan at the end of 2016 both of which were unremarkable.     He was last seen in our office 6 months ago. Since that time his been feeling relatively well. He does notice occasional heart palpitations which feels like a thumping sensation in his chest.  This can last up to an hour at a time. He did activate his device during 1 of these episodes. He denies any dizziness, syncope or near syncope. No major change in his activity level. No shortness of breath, orthopnea, PND, leg swelling or claudication. No new chest pain or pressure.          Past Medical History:   Diagnosis Date    CVA (cerebrovascular accident) ([facility] [address] 2016    Diabetes ([facility] [address]      H/O echocardiogram 12/2016     EF 60%, mild concentric LVH, mild LAE    Hypertension      Seizure ([facility] [address]      Status post placement of implantable loop recorder 05/2017     Medtronic Reveal LINQ             Current Outpatient Prescriptions   Medication Sig Dispense Refill    empagliflozin (JARDIANCE PO) Take  by mouth.        valsartan (DIOVAN) 160 mg tablet Take 1 Tab by mouth daily. 30 Tab 1    metFORMIN (GLUCOPHAGE) 500 mg tablet Take 1 Tab by mouth two (2) times daily (with meals). (Patient taking differently: Take 1,000 mg by mouth two (2) times daily (with meals). ) 1 Tab 0    atorvastatin (LIPITOR) 80 mg tablet Take 1 Tab by mouth daily. Indications: prevention of cerebrovascular accident 24 Tab 0    insulin glargine (LANTUS) 100 unit/mL injection 66 Units by SubCUTAneous route nightly.  (Patient taking differently: 30 Units by SubCUTAneous route two (2) times a day.) 1 Vial 0    aspirin (ASPIRIN) 325 mg tablet Take 1 Tab by mouth daily. Indications: acute thromboembolic stroke 30 Tab 2    levETIRAcetam (KEPPRA) 1,000 mg tablet Take 1 Tab by mouth two (2) times a day. 60 Tab 0      No Known Allergies             Social History   Substance Use Topics    Smoking status: Never Smoker    Smokeless tobacco: None    Alcohol use No         Comment: Occasionally            Family History   Problem Relation Age of Onset    Heart Surgery Mother      COPD Mother      Diabetes Mother              Review of Systems   Constitutional: Negative for chills, fever and weight loss. HENT: Negative for nosebleeds. Eyes: Negative for blurred vision and double vision. Respiratory: Negative for cough, shortness of breath and wheezing. Cardiovascular: Positive for palpitations. Negative for chest pain, orthopnea, claudication, leg swelling and PND. Gastrointestinal: Negative for abdominal pain, heartburn, nausea and vomiting. Genitourinary: Negative for dysuria and hematuria. Musculoskeletal: Negative for falls and myalgias. Skin: Negative for rash. Neurological: Negative for dizziness, focal weakness and headaches. Endo/Heme/Allergies: Does not bruise/bleed easily. Psychiatric/Behavioral: Negative for substance abuse.           Visit Vitals    /82    Pulse 89    Ht 5' 6\" (1.676 m)    Wt 98.4 kg (217 lb)    SpO2 96%    BMI 35.02 kg/m2         Physical Exam   Constitutional: He is oriented to person, place, and time. He appears well-developed and well-nourished. HENT:   Head: Normocephalic and atraumatic. Eyes: Conjunctivae are normal.   Neck: Neck supple. No JVD present. Carotid bruit is not present. Cardiovascular: Normal rate, regular rhythm, S1 normal, S2 normal and normal pulses. Exam reveals no gallop and no S3. No murmur heard.   Pulmonary/Chest: Breath sounds normal. He has no wheezes. He has no rales. Abdominal: Soft. Bowel sounds are normal. There is no tenderness. Musculoskeletal: He exhibits no edema. Neurological: He is alert and oriented to person, place, and time. Skin: Skin is warm and dry.      EKG: Normal sinus rhythm, normal axis, normal QTc interval, no ST/T wave abnormalities concerning for ischemia. No change compared to the previous tracing.     Loop recorder interrogation. Normal functioning device. One symptom corresponded to sinus rhythm with a heart rate in the 90s. No atrial fibrillation identified. No pauses, no bradycardias.     ASSESSMENT and PLAN       Encounter Diagnoses   Name Primary?  Essential hypertension Yes    Cryptogenic stroke (HCC)      Dyslipidemia      Controlled type 2 diabetes mellitus without complication, without long-term current use of insulin (HCC)        Heart palpitations. No significant arrhythmia identified on his loop recorder interrogation today. He did activate the loop recorder a single time since last interrogation which occurred earlier this month. Patient was in sinus rhythm with a normal heart rate at the time. This is likely noncardiac in nature.     Cryptogenic CVA. No evidence of atrial fibrillation seen on his loop recorder which is implanted 1.5  years ago.     Essential hypertension. Patient's blood pressure appears to be reasonably well-controlled on his current medical regimen, all of which I would continue.     Dyslipidemia. Patient has been treated with a high-dose potent statin. This is followed by his PCP.     Diabetes mellitus, type II. Patient is managed with oral agents and insulin. His hemoglobin A1c should be less than 7 from a cardiac standpoint. Will explant patient's loop recorder today as scheduled.

## 2018-11-27 NOTE — Clinical Note
The pocket was closed in 1 layer(s) using interrupted 4-0 vicryl sutures. Needle, sponge, and instrument counts were verified correctly. Applied dressing(s): steri strips.

## 2018-11-27 NOTE — Clinical Note
Sheath #1: Dressed using non-adherent dressing. Site: clean, dry, & intact, no bleeding and no hematoma.

## 2018-11-27 NOTE — Clinical Note
TRANSFER - IN REPORT:  
 
Verbal report received from: Thad Ruelas RN. Report consisted of patient's Situation, Background, Assessment and  
Recommendations(SBAR). Opportunity for questions and clarification was provided. Assessment completed upon patient's arrival to unit and care assumed. Patient transported with a Cardiac Cath Tech / Patient Care Tech.

## 2018-11-27 NOTE — PROCEDURES
Brief procedure note:    Procedure:  Medtronic Reveal Implantable Loop Recorder Explantation    Anesthesia:  Local     EBL: minimal < 5 cc    Complications: none    Description:    Device area was locally anesthestized with 9 cc of 1% Lidocaine with Epi. Skin was incised with a 15 blade and device was removed using forceps. Skin layer was secured with 4-0 Vicryl suture.

## 2018-11-27 NOTE — DISCHARGE INSTRUCTIONS
DISCHARGE SUMMARY from Nurse    PATIENT INSTRUCTIONS:    After general anesthesia or intravenous sedation, for 24 hours or while taking prescription Narcotics:  · Limit your activities  · Do not drive and operate hazardous machinery  · Do not make important personal or business decisions  · Do  not drink alcoholic beverages  · If you have not urinated within 8 hours after discharge, please contact your surgeon on call. Report the following to your surgeon:  · Excessive pain, swelling, redness or odor of or around the surgical area  · Temperature over 100.5  · Nausea and vomiting lasting longer than 4 hours or if unable to take medications  · Any signs of decreased circulation or nerve impairment to extremity: change in color, persistent  numbness, tingling, coldness or increase pain  · Any questions    What to do at Home:  Recommended activity: {discharge activity:06178}, ***    If you experience any of the following symptoms ***, please follow up with ***. *  Please give a list of your current medications to your Primary Care Provider. *  Please update this list whenever your medications are discontinued, doses are      changed, or new medications (including over-the-counter products) are added. *  Please carry medication information at all times in case of emergency situations. These are general instructions for a healthy lifestyle:    No smoking/ No tobacco products/ Avoid exposure to second hand smoke  Surgeon General's Warning:  Quitting smoking now greatly reduces serious risk to your health.     Obesity, smoking, and sedentary lifestyle greatly increases your risk for illness    A healthy diet, regular physical exercise & weight monitoring are important for maintaining a healthy lifestyle    You may be retaining fluid if you have a history of heart failure or if you experience any of the following symptoms:  Weight gain of 3 pounds or more overnight or 5 pounds in a week, increased swelling in our hands or feet or shortness of breath while lying flat in bed. Please call your doctor as soon as you notice any of these symptoms; do not wait until your next office visit. Recognize signs and symptoms of STROKE:    F-face looks uneven    A-arms unable to move or move unevenly    S-speech slurred or non-existent    T-time-call 911 as soon as signs and symptoms begin-DO NOT go       Back to bed or wait to see if you get better-TIME IS BRAIN. Warning Signs of HEART ATTACK     Call 911 if you have these symptoms:   Chest discomfort. Most heart attacks involve discomfort in the center of the chest that lasts more than a few minutes, or that goes away and comes back. It can feel like uncomfortable pressure, squeezing, fullness, or pain.  Discomfort in other areas of the upper body. Symptoms can include pain or discomfort in one or both arms, the back, neck, jaw, or stomach.  Shortness of breath with or without chest discomfort.  Other signs may include breaking out in a cold sweat, nausea, or lightheadedness. Don't wait more than five minutes to call 911 - MINUTES MATTER! Fast action can save your life. Calling 911 is almost always the fastest way to get lifesaving treatment. Emergency Medical Services staff can begin treatment when they arrive -- up to an hour sooner than if someone gets to the hospital by car. The discharge information has been reviewed with the {PATIENT PARENT GUARDIAN:24221}. The {PATIENT PARENT GUARDIAN:25579} verbalized understanding. Discharge medications reviewed with the {Dishcarge meds reviewed JULIA:50569} and appropriate educational materials and side effects teaching were provided.   ___________________________________________________________________________________________________________________________________

## (undated) DEVICE — KENDALL RADIOLUCENT FOAM MONITORING ELECTRODE RECTANGULAR SHAPE: Brand: KENDALL

## (undated) DEVICE — SUTURE COAT VCRL PC 5 PRECIS COSM CONVENTIONAL CUT PRIM 3 8 J823H

## (undated) DEVICE — DRAPE STRL ANGIO W/ 2 FLD COLLCTN PCH 86X135 218X343 CM 2

## (undated) DEVICE — COVADERM: Brand: DEROYAL

## (undated) DEVICE — Device